# Patient Record
Sex: FEMALE | Race: OTHER | HISPANIC OR LATINO | Employment: FULL TIME | URBAN - METROPOLITAN AREA
[De-identification: names, ages, dates, MRNs, and addresses within clinical notes are randomized per-mention and may not be internally consistent; named-entity substitution may affect disease eponyms.]

---

## 2023-04-14 LAB
CHOLESTEROL (MG/DL) IN SER/PLAS: 170 MG/DL (ref 0–199)
CHOLESTEROL IN HDL (MG/DL) IN SER/PLAS: 75.5 MG/DL
CHOLESTEROL/HDL RATIO: 2.3
ERYTHROCYTE DISTRIBUTION WIDTH (RATIO) BY AUTOMATED COUNT: 13.3 % (ref 11.5–14.5)
ERYTHROCYTE MEAN CORPUSCULAR HEMOGLOBIN CONCENTRATION (G/DL) BY AUTOMATED: 31.5 G/DL (ref 32–36)
ERYTHROCYTE MEAN CORPUSCULAR VOLUME (FL) BY AUTOMATED COUNT: 91 FL (ref 80–100)
ERYTHROCYTES (10*6/UL) IN BLOOD BY AUTOMATED COUNT: 4.44 X10E12/L (ref 4–5.2)
ESTIMATED AVERAGE GLUCOSE FOR HBA1C: 111 MG/DL
HEMATOCRIT (%) IN BLOOD BY AUTOMATED COUNT: 40.3 % (ref 36–46)
HEMOGLOBIN (G/DL) IN BLOOD: 12.7 G/DL (ref 12–16)
HEMOGLOBIN A1C/HEMOGLOBIN TOTAL IN BLOOD: 5.5 %
LDL: 82 MG/DL (ref 0–99)
LEUKOCYTES (10*3/UL) IN BLOOD BY AUTOMATED COUNT: 7 X10E9/L (ref 4.4–11.3)
PLATELETS (10*3/UL) IN BLOOD AUTOMATED COUNT: 233 X10E9/L (ref 150–450)
THYROTROPIN (MIU/L) IN SER/PLAS BY DETECTION LIMIT <= 0.05 MIU/L: 1.6 MIU/L (ref 0.44–3.98)
TRIGLYCERIDE (MG/DL) IN SER/PLAS: 63 MG/DL (ref 0–149)
VLDL: 13 MG/DL (ref 0–40)

## 2023-10-15 RX ORDER — LEVONORGESTREL 52 MG/1
INTRAUTERINE DEVICE INTRAUTERINE
COMMUNITY

## 2023-10-17 ENCOUNTER — HOSPITAL ENCOUNTER (OUTPATIENT)
Dept: RADIOLOGY | Facility: HOSPITAL | Age: 33
Discharge: HOME | End: 2023-10-17
Payer: COMMERCIAL

## 2023-10-17 ENCOUNTER — OFFICE VISIT (OUTPATIENT)
Dept: ORTHOPEDIC SURGERY | Facility: HOSPITAL | Age: 33
End: 2023-10-17
Payer: COMMERCIAL

## 2023-10-17 DIAGNOSIS — M25.871 MASS OF JOINT OF RIGHT ANKLE: ICD-10-CM

## 2023-10-17 PROCEDURE — 73610 X-RAY EXAM OF ANKLE: CPT | Mod: RIGHT SIDE | Performed by: RADIOLOGY

## 2023-10-17 PROCEDURE — 73610 X-RAY EXAM OF ANKLE: CPT | Mod: RT,FY

## 2023-10-17 PROCEDURE — 99213 OFFICE O/P EST LOW 20 MIN: CPT | Performed by: STUDENT IN AN ORGANIZED HEALTH CARE EDUCATION/TRAINING PROGRAM

## 2023-10-17 PROCEDURE — 99203 OFFICE O/P NEW LOW 30 MIN: CPT | Performed by: STUDENT IN AN ORGANIZED HEALTH CARE EDUCATION/TRAINING PROGRAM

## 2023-10-17 PROCEDURE — 1036F TOBACCO NON-USER: CPT | Performed by: STUDENT IN AN ORGANIZED HEALTH CARE EDUCATION/TRAINING PROGRAM

## 2023-10-17 NOTE — PROGRESS NOTES
ORTHOPAEDIC SURGERY HISTORY & PHYSICAL     Chief Complaint:  Right ankle mass    History Of Present Illness  Cristina Bowery-Lombardo is a 33 y.o. female who presents for evaluation of the right foot mass, self-referred.  Patient reports noticing a mass on the outside of her ankle for approximately 1 month.  There is no associated pain with the mass.  She has not had any irritation with shoe wear.  She reports no trauma or associated numbness, tingling or weakness.  She has not noted any change in the size of the mass, neither increasing or decreasing.  She has not had any other associated symptoms.    Patient reports a remote history, circa 2011 of a significant foot injury while in college.  She brought with her a CT report that describes a intra-articular cuboid fracture.  She has not had significant symptoms from this injury.    Patient is otherwise in her usual state of health.  She works in a sales capacity.     Past Medical History  Past Medical History:   Diagnosis Date    Other specified health status     Patient denies medical problems       Surgical History  Recent Surgeries in Orthopaedic Surgery            No cases to display             Social History  Social History     Socioeconomic History    Marital status:      Spouse name: None    Number of children: None    Years of education: None    Highest education level: None   Occupational History    None   Tobacco Use    Smoking status: Never    Smokeless tobacco: Never   Substance and Sexual Activity    Alcohol use: None    Drug use: None    Sexual activity: None   Other Topics Concern    None   Social History Narrative    None     Social Determinants of Health     Financial Resource Strain: Not on file   Food Insecurity: Not on file   Transportation Needs: Not on file   Physical Activity: Not on file   Stress: Not on file   Social Connections: Not on file   Intimate Partner Violence: Not on file   Housing Stability: Not on file       Family  History  Family History   Problem Relation Name Age of Onset    Osteopenia Mother      Hypertension Father          Allergies  Allergies   Allergen Reactions    Penicillins Agitation, Chills, Dry mouth, Fever, Hives, Itching and Rash       Review of Systems  REVIEW OF SYSTEMS  Constitutional: no unplanned weight loss  Psychiatric: no suicidal ideation  ENT: no vision changes, no sinus problems  Pulmonary: no shortness of breath during office visit today  Lymphatic: no enlarged lymph nodes  Cardiovascular: no chest pain or shortness of breath during office visit today  Gastrointestinal: no stomach problems  Genitourinary: no dysuria   Skin: no rashes  Endocrine: no thyroid problems  Neurological: no headache, no numbness  Hematological: no easy bruising  Musculoskeletal: Right foot mass    Physical Exam  PHYSICAL EXAMINATION  Constitutional Exam: well developed and well nourished  Psychiatric Exam: alert and oriented, appropriate mood and behavior  Eye Exam: EOMI  Pulmonary Exam: breathing non-labored, no apparent distress  Lymphatic exam: no appreciable lymphadenopathy in the lower extremities  Cardiovascular exam: RRR to peripheral palpation, DP pulses 2+, PT 2+, toes are pink with good capillary refill, no pitting edema  Skin exam: no open lesions, rashes, abrasions or ulcerations  Neurological exam: sensation to light touch intact in both lower extremities in peripheral and dermatomal distributions (except for any abnormalities noted in musculoskeletal exam)    Musculoskeletal exam: Right lower extremity examination.  There is a 1 x 1 cm mobile nodule overlying the anterior lateral distal fibula.  Does not move with inversion or eversion of the foot, nor with ankle dorsiflexion or plantarflexion.  Patient is otherwise nontender to palpation at the ATFL, CFL and peroneal tendons.  Patient has supple and pain-free ankle, subtalar and midtarsal joint range of motion.  She has sensation intact light touch grossly in  a saphenous, sural, superficial peroneal, deep peroneal and tibial nerve distribution.  She has 5 out of 5 strength in plantarflexion, dorsiflexion and EHL.  She has 2+ DP/PT pulse palpated.    Last Recorded Vitals  There were no vitals taken for this visit.    Laboratory Results    No results found for this or any previous visit (from the past 24 hour(s)).    Radiology Results   X-ray imaging 3 view weightbearing right ankle reviewed from 10/17/2023 and independently evaluated by me demonstrates no acute fracture or dislocation.  Ankle mortise appears well aligned.  Lateral projection suggests increased anterior distal tibial osteophytes, perhaps consistent with anterior ankle impingement or sequela of prior trauma.    Assessment/Plan:  33-year-old female who in my impression has suspected right ankle ganglion cyst, currently asymptomatic.  I have reviewed the diagnosis and treatment options extensively with the patient.  In my impression, the patient may continue weightbearing as tolerated in her right lower extremity.  Given that she currently has no associated pain or irritation with shoe wear and has not noted any change in the size of the mass, I encouraged the patient to contact the office if she develops any new pain, change in her symptoms or if she has any further questions.  I reviewed that clinically I suspect a ganglion cyst and that these can wax and wane in both symptoms and size and treatment would be dictated by her symptoms at this time.  I will plan to see the patient back in approximately 3 months for a repeat clinical and radiographic evaluation.  Anticipate discussion regarding MRI with and without contrast to evaluate her ankle mass.  Upon return, patient require 3 views weightbearing right foot to evaluate her prior cuboid injury.    Hitesh Diggs MD, BRENNA  Department of Orthopaedic Surgery  [facility]    The diagnosis and treatment plan were reviewed  with the patient. All questions were answered. The patient verbalized understanding of the treatment plan. There were no barriers to understanding identified.    Note dictated with Install[employer]etizer software.  Completed without full type editing and sent to avoid delay.

## 2023-10-26 DIAGNOSIS — R22.41 ANKLE MASS, RIGHT: Primary | ICD-10-CM

## 2023-11-10 ENCOUNTER — APPOINTMENT (OUTPATIENT)
Dept: RADIOLOGY | Facility: CLINIC | Age: 33
End: 2023-11-10
Payer: COMMERCIAL

## 2023-11-27 ENCOUNTER — APPOINTMENT (OUTPATIENT)
Dept: RADIOLOGY | Facility: CLINIC | Age: 33
End: 2023-11-27
Payer: COMMERCIAL

## 2023-12-05 ENCOUNTER — OFFICE VISIT (OUTPATIENT)
Dept: ORTHOPEDIC SURGERY | Facility: HOSPITAL | Age: 33
End: 2023-12-05
Payer: COMMERCIAL

## 2023-12-05 DIAGNOSIS — M25.871 MASS OF JOINT OF RIGHT ANKLE: Primary | ICD-10-CM

## 2023-12-05 PROCEDURE — 99213 OFFICE O/P EST LOW 20 MIN: CPT | Performed by: STUDENT IN AN ORGANIZED HEALTH CARE EDUCATION/TRAINING PROGRAM

## 2023-12-05 PROCEDURE — 1036F TOBACCO NON-USER: CPT | Performed by: STUDENT IN AN ORGANIZED HEALTH CARE EDUCATION/TRAINING PROGRAM

## 2023-12-05 NOTE — PROGRESS NOTES
ORTHOPAEDIC SURGERY PROGRESS NOTE    Chief Complaint:  Right ankle mass    History Of Present Illness  Cristina Bowery-Lombardo is a 33 y.o. female who presents for evaluation of the right foot mass, self-referred.  Patient reports noticing a mass on the outside of her ankle for approximately 1 month.  There is no associated pain with the mass.  She has not had any irritation with shoe wear.  She reports no trauma or associated numbness, tingling or weakness.  She has not noted any change in the size of the mass, neither increasing or decreasing.  She has not had any other associated symptoms.    Patient reports a remote history, circa 2011 of a significant foot injury while in college.  She brought with her a CT report that describes a intra-articular cuboid fracture.  She has not had significant symptoms from this injury.    Patient is otherwise in her usual state of health.  She works in a sales capacity.    12/05/2023: Patient returns for follow-up of her right foot mass.  Patient reports no significant change in the size or symptoms related to her foot mass.  She has noted increased pressure and swelling on certain days which results in discomfort but has not limited any of her activities.  She denies any associated numbness, tingling or weakness.     Past Medical History  Past Medical History:   Diagnosis Date    Other specified health status     Patient denies medical problems       Surgical History  Recent Surgeries in Orthopaedic Surgery            No cases to display             Social History  Social History     Socioeconomic History    Marital status:      Spouse name: Not on file    Number of children: Not on file    Years of education: Not on file    Highest education level: Not on file   Occupational History    Not on file   Tobacco Use    Smoking status: Never    Smokeless tobacco: Never   Substance and Sexual Activity    Alcohol use: Not on file    Drug use: Not on file    Sexual activity:  Not on file   Other Topics Concern    Not on file   Social History Narrative    Not on file     Social Determinants of Health     Financial Resource Strain: Not on file   Food Insecurity: Not on file   Transportation Needs: Not on file   Physical Activity: Not on file   Stress: Not on file   Social Connections: Not on file   Intimate Partner Violence: Not on file   Housing Stability: Not on file       Family History  Family History   Problem Relation Name Age of Onset    Osteopenia Mother      Hypertension Father          Allergies  Allergies   Allergen Reactions    Penicillins Agitation, Chills, Dry mouth, Fever, Hives, Itching and Rash       Review of Systems  REVIEW OF SYSTEMS  Constitutional: no unplanned weight loss  Psychiatric: no suicidal ideation  ENT: no vision changes, no sinus problems  Pulmonary: no shortness of breath during office visit today  Lymphatic: no enlarged lymph nodes  Cardiovascular: no chest pain or shortness of breath during office visit today  Gastrointestinal: no stomach problems  Genitourinary: no dysuria   Skin: no rashes  Endocrine: no thyroid problems  Neurological: no headache, no numbness  Hematological: no easy bruising  Musculoskeletal: Right foot mass    Physical Exam  PHYSICAL EXAMINATION  Constitutional Exam: well developed and well nourished  Psychiatric Exam: alert and oriented, appropriate mood and behavior  Eye Exam: EOMI  Pulmonary Exam: breathing non-labored, no apparent distress  Lymphatic exam: no appreciable lymphadenopathy in the lower extremities  Cardiovascular exam: RRR to peripheral palpation, DP pulses 2+, PT 2+, toes are pink with good capillary refill, no pitting edema  Skin exam: no open lesions, rashes, abrasions or ulcerations  Neurological exam: sensation to light touch intact in both lower extremities in peripheral and dermatomal distributions (except for any abnormalities noted in musculoskeletal exam)    Musculoskeletal exam: Right lower extremity  examination.  Patient continues with a mobile nodule just distal to the fibula.  No obvious movement with ankle inversion or eversion nor with ankle dorsiflexion or plantarflexion.  Patient is currently nontender to palpation at the ATFL, CFL and peroneal tendons.  She continues with supple and pain-free ankle, subtalar and midtarsal joint range of motion.  She has sensation intact light touch grossly to saphenous, sural, superficial peroneal, deep peroneal and tibial nerve distribution.  She has 5 out of 5 strength in plantarflexion, dorsiflexion and EHL.  She has 2+ DP/PT pulse palpated.    Last Recorded Vitals  There were no vitals taken for this visit.    Laboratory Results    No results found for this or any previous visit (from the past 24 hour(s)).    Radiology Results   MRI right ankle with and without contrast from [facility] facility reviewed and temporarily uploaded into our PACS system from 11/29/2023 independently evaluated by me on 12/05/2023 demonstrates no obvious fracture or dislocation.  There is a multilobulated cyst that appears to communicate with the subtalar joint, no obvious connection to peroneal tendons.    Assessment/Plan:  33-year-old female who in my impression has suspected right ankle ganglion cyst with intermittent swelling, pressure and discomfort.  I have reviewed the diagnosis and treatment options extensively with the patient.  In my impression the patient may continue weightbearing as tolerated in her right lower extremity.  I discussed with the patient that due to her overall minimal symptoms that I would not recommend excisional biopsy at this time.  I reviewed that given the relative proximity to the subtalar joint/peroneal tendons that it may be quite difficult to close the pericapsular tissue and so may have an increased risk of recurrence inherently.  I reviewed that due to the multilobulated nature of the mass that is it reasonable to attempt for aspiration and injection USG and  I have personally reached out to one of my sports medicine colleagues to coordinate care for this patient.  I will plan to see the patient back in approximate 3 months to monitor her response to treatment.  Upon return, patient would not require further imaging.    Hitesh Diggs MD, BRENNA  Department of Orthopaedic Surgery  [facility]    The diagnosis and treatment plan were reviewed with the patient. All questions were answered. The patient verbalized understanding of the treatment plan. There were no barriers to understanding identified.    Note dictated with Trot software.  Completed without full type editing and sent to avoid delay.

## 2023-12-07 ENCOUNTER — OFFICE VISIT (OUTPATIENT)
Dept: ORTHOPEDIC SURGERY | Facility: CLINIC | Age: 33
End: 2023-12-07
Payer: COMMERCIAL

## 2023-12-07 DIAGNOSIS — M25.871 MASS OF JOINT OF RIGHT ANKLE: Primary | ICD-10-CM

## 2023-12-07 PROCEDURE — 10160 PNXR ASPIR ABSC HMTMA BULLA: CPT | Performed by: FAMILY MEDICINE

## 2023-12-07 PROCEDURE — 76942 ECHO GUIDE FOR BIOPSY: CPT | Performed by: FAMILY MEDICINE

## 2023-12-07 PROCEDURE — 1036F TOBACCO NON-USER: CPT | Performed by: FAMILY MEDICINE

## 2023-12-07 PROCEDURE — 99213 OFFICE O/P EST LOW 20 MIN: CPT | Performed by: FAMILY MEDICINE

## 2023-12-07 ASSESSMENT — PAIN - FUNCTIONAL ASSESSMENT: PAIN_FUNCTIONAL_ASSESSMENT: 0-10

## 2023-12-07 ASSESSMENT — PAIN DESCRIPTION - DESCRIPTORS: DESCRIPTORS: DISCOMFORT

## 2023-12-07 ASSESSMENT — PAIN SCALES - GENERAL: PAINLEVEL_OUTOF10: 0 - NO PAIN

## 2023-12-07 NOTE — PROGRESS NOTES
Dr Diggs ref R ankle ganglion cyst asp     Subjective    Patient ID: Cristina Bowery-Lombardo is a 33 y.o. female.    Chief Complaint: Pain of the Right Ankle and Ganglion Cyst   Cristina Bowery-Lombardo is a 33 y.o. female who presents for evaluation of the right foot mass, from Dr. Diggs.  Please see his notes in the chart for complete details and treatment course.  Briefly, patient reports noticing a mass on the outside of her ankle for approximately 4 months.  There is no associated pain with the mass.  She has noticed recently that she has had some irritation with shoe wear and possibly walking a little bit differently because of it.    Patient reports a remote history, circa 2011 of a significant foot injury while in college, a intra-articular cuboid fracture.  She has not had significant symptoms from this injury.     Patient is otherwise in her usual state of health.  She works in a sales capacity.     She has been referred here to discuss the possibility of an ultrasound-guided cyst drainage and steroid injection.    Objective   Musculoskeletal exam: Right lower extremity examination.  Patient continues with a mobile nodule just distal to the fibula.  No obvious movement with ankle inversion or eversion nor with ankle dorsiflexion or plantarflexion.  Patient is currently nontender to palpation at the ATFL, CFL and peroneal tendons.  She continues with supple and pain-free ankle, subtalar and midtarsal joint range of motion.  She has sensation intact light touch grossly to saphenous, sural, superficial peroneal, deep peroneal and tibial nerve distribution.  She has 5 out of 5 strength in plantarflexion, dorsiflexion and EHL.  She has 2+ DP/PT pulse palpated.  No significant change from Dr. Jhaveri's exam.    Image Results:  XR ankle right 3+ views  Narrative: Interpreted By:  Sincere Casillas,   STUDY:  XR ANKLE RIGHT 3+ VIEWS      INDICATION:  Signs/Symptoms:mass of R ankle.      COMPARISON:  None       ACCESSION NUMBER(S):  [id number]      ORDERING CLINICIAN:  MANSI ARELLANO      FINDINGS:  Mild pes planus deformity.      No evidence of right ankle fracture or lesion. Joint spaces are  normal.      Impression: Pes planus deformity. No acute findings right ankle.          Signed by: Sincere Casillas 10/17/2023 10:30 AM  Dictation workstation:   [id number]    MRI right ankle with and without contrast from OSH facility reviewed and temporarily uploaded into our PACS system from 11/29/2023 independently evaluated by me on 12/05/2023 demonstrates no obvious fracture or dislocation.  There is a multilobulated cyst that appears to communicate with the subtalar joint, no obvious connection to peroneal tendons.     US guided aspiration of abscess, hematoma, cyst    Date/Time: 12/7/2023 10:47 AM    Performed by: Telly Cline MD  Authorized by: Telly Cline MD    Consent:     Consent obtained:  Verbal    Consent given by:  Patient    Risks, benefits, and alternatives were discussed: yes      Risks discussed:  Bleeding, infection, pain, incomplete drainage, poor cosmetic result and nerve damage    Alternatives discussed:  No treatment, alternative treatment and observation  Universal protocol:     Procedure explained and questions answered to patient or proxy's satisfaction: yes      Imaging studies available: yes      Site/side marked: yes      Immediately prior to procedure, a time out was called: yes      Patient identity confirmed:  Verbally with patient  Pre-procedure details:     Skin preparation:  Povidone-iodine  Sedation:     Sedation type:  None  Anesthesia:     Anesthesia method:  Topical application and local infiltration    Local anesthetic:  Lidocaine 1% w/o epi  Procedure specific details:      Ganglion cyst of the wrist drainage and steroid injection under ultrasound guidance. Risks, benefits, and alternatives were explained to the patient and verbal and written consent was obtained. The patient  was placed in a seated position and a bump was placed under the . The linear transducer was used to identify the ganglion cyst. The area of injection was cleaned with a Betadine prep and ethyl chloride spray was used to numb the skin. A 5/8 inch 25-gauge needle was used to inject 1 mL of 1% lidocaine into the skin and deep tissue for local anesthesia. Next, an 18-gauge 1-1/2 inch needle was placed into the cyst and 2-1/2 cc of viscous clear fluid was removed from the cyst. The cyst resolved under ultrasound.  The syringe was then changed to 1 containing 1 mL of Kenalog which was injected. The fluid flowed freely without obstruction and the entire contents of the syringe was injected. The needle tip was visualized throughout and pictures were taken with the ultrasound machine. The needle was then removed, the areas cleaned with an alcohol prep, and a sterile Band-Aid was placed. The patient tolerated procedure well there were no locations or was no blood loss. Ultrasound images were obtained throughout the procedure and stored for later review if needed..  Post-procedure details:     Procedure completion:  Tolerated well, no immediate complications        Assessment/Plan   Encounter Diagnoses:  Mass of joint of right ankle    Orders Placed This Encounter    Point of Care Ultrasound    US guided aspiration of abscess, hematoma, cyst     She tolerated the procedure well.  We reviewed that she should not submerge the ankle for the next 2 to 3 days.  She should take it easy on the ankle for the next 24 to 48 hours.  She can take anti-inflammatories as needed for discomfort.  There is the possibility that this will return.  She can follow-up with either myself or Dr. Jhaveri.  All of her questions were answered and she agrees with treatment plan.    ** Please excuse any errors in grammar or translation related to this dictation. Voice recognition software was utilized to prepare this document. **    Telly Cline,  M.D.  Clinical , Division of Sports Medicine  Primary Care Sports Medicine  Department of Orthopedic Surgery  [facility] [place]

## 2024-01-16 ENCOUNTER — APPOINTMENT (OUTPATIENT)
Dept: ORTHOPEDIC SURGERY | Facility: HOSPITAL | Age: 34
End: 2024-01-16
Payer: COMMERCIAL

## 2024-01-16 ENCOUNTER — OFFICE VISIT (OUTPATIENT)
Dept: ORTHOPEDIC SURGERY | Facility: HOSPITAL | Age: 34
End: 2024-01-16
Payer: COMMERCIAL

## 2024-01-16 DIAGNOSIS — M25.871 MASS OF JOINT OF RIGHT ANKLE: Primary | ICD-10-CM

## 2024-01-16 PROCEDURE — 1036F TOBACCO NON-USER: CPT | Performed by: STUDENT IN AN ORGANIZED HEALTH CARE EDUCATION/TRAINING PROGRAM

## 2024-01-16 PROCEDURE — 99212 OFFICE O/P EST SF 10 MIN: CPT | Performed by: STUDENT IN AN ORGANIZED HEALTH CARE EDUCATION/TRAINING PROGRAM

## 2024-01-16 ASSESSMENT — PAIN - FUNCTIONAL ASSESSMENT: PAIN_FUNCTIONAL_ASSESSMENT: 0-10

## 2024-01-16 ASSESSMENT — PAIN SCALES - GENERAL: PAINLEVEL_OUTOF10: 2

## 2024-01-16 ASSESSMENT — PAIN DESCRIPTION - DESCRIPTORS: DESCRIPTORS: ACHING;DULL

## 2024-01-16 NOTE — PROGRESS NOTES
ORTHOPAEDIC SURGERY PROGRESS NOTE    Chief Complaint:  Right ankle mass    History Of Present Illness  Cristina Bowery-Lombardo is a 33 y.o. female who presents for evaluation of the right foot mass, self-referred.  Patient reports noticing a mass on the outside of her ankle for approximately 1 month.  There is no associated pain with the mass.  She has not had any irritation with shoe wear.  She reports no trauma or associated numbness, tingling or weakness.  She has not noted any change in the size of the mass, neither increasing or decreasing.  She has not had any other associated symptoms.    Patient reports a remote history, circa 2011 of a significant foot injury while in college.  She brought with her a CT report that describes a intra-articular cuboid fracture.  She has not had significant symptoms from this injury.    Patient is otherwise in her usual state of health.  She works in a sales capacity.    12/05/2023: Patient returns for follow-up of her right foot mass.  Patient reports no significant change in the size or symptoms related to her foot mass.  She has noted increased pressure and swelling on certain days which results in discomfort but has not limited any of her activities.  She denies any associated numbness, tingling or weakness.    01/16/2024: Patient returns for follow-up of her right foot mass.  She did undergo USG aspiration from 12/07/2023.  She did initially notice a decrease in the size of the mass and symptoms but unfortunately had recurrence approximately 10 days later.  She has had some increased irritation with shoewear overlying the mass but overall reports 2 out of 10 pain.  She denies any new trauma.  She has no associated numbness, tingling or weakness.     Past Medical History  Past Medical History:   Diagnosis Date    Other specified health status     Patient denies medical problems       Surgical History  Recent Surgeries in Orthopaedic Surgery            No cases to display              Social History  Social History     Socioeconomic History    Marital status:      Spouse name: Not on file    Number of children: Not on file    Years of education: Not on file    Highest education level: Not on file   Occupational History    Not on file   Tobacco Use    Smoking status: Never    Smokeless tobacco: Never   Substance and Sexual Activity    Alcohol use: Not on file    Drug use: Not on file    Sexual activity: Not on file   Other Topics Concern    Not on file   Social History Narrative    Not on file     Social Determinants of Health     Financial Resource Strain: Not on file   Food Insecurity: Not on file   Transportation Needs: Not on file   Physical Activity: Not on file   Stress: Not on file   Social Connections: Not on file   Intimate Partner Violence: Not on file   Housing Stability: Not on file       Family History  Family History   Problem Relation Name Age of Onset    Osteopenia Mother      Hypertension Father          Allergies  Allergies   Allergen Reactions    Penicillins Agitation, Chills, Dry mouth, Fever, Hives, Itching and Rash       Review of Systems  REVIEW OF SYSTEMS  Constitutional: no unplanned weight loss  Psychiatric: no suicidal ideation  ENT: no vision changes, no sinus problems  Pulmonary: no shortness of breath during office visit today  Lymphatic: no enlarged lymph nodes  Cardiovascular: no chest pain or shortness of breath during office visit today  Gastrointestinal: no stomach problems  Genitourinary: no dysuria   Skin: no rashes  Endocrine: no thyroid problems  Neurological: no headache, no numbness  Hematological: no easy bruising  Musculoskeletal: Right foot mass    Physical Exam  PHYSICAL EXAMINATION  Constitutional Exam: well developed and well nourished  Psychiatric Exam: alert and oriented, appropriate mood and behavior  Eye Exam: EOMI  Pulmonary Exam: breathing non-labored, no apparent distress  Lymphatic exam: no appreciable lymphadenopathy in the  lower extremities  Cardiovascular exam: RRR to peripheral palpation, DP pulses 2+, PT 2+, toes are pink with good capillary refill, no pitting edema  Skin exam: no open lesions, rashes, abrasions or ulcerations  Neurological exam: sensation to light touch intact in both lower extremities in peripheral and dermatomal distributions (except for any abnormalities noted in musculoskeletal exam)    Musculoskeletal exam: Right lower extremity examination.  Patient with mobile soft tissue mass just distal to the fibula but the sinus Tarsi.  She is focally nontender to palpation overlying the mass.  There does appear to be excoriated skin.  She continues with supple and pain-free ankle, subtalar and midtarsal joint range of motion.  She has sensation intact light touch grossly to saphenous, sural, superficial peroneal, deep peroneal and tibial nerve distribution.  She has 5 out of 5 strength in plantarflexion, dorsiflexion and EHL.  She has 2+ DP/PT pulse palpated.    Last Recorded Vitals  There were no vitals taken for this visit.    Laboratory Results    No results found for this or any previous visit (from the past 24 hour(s)).    Radiology Results   No new imaging at this visit.    Assessment/Plan:  33-year-old female who in my impression has suspected right ankle ganglion cyst, MRI with and without contrast temporarily uploaded into the PACS who presents with right foot mass recurrence following USG attempted aspiration.  I have reviewed the diagnosis and treatment options extensively with the patient.  In my impression the patient should continue weightbearing as tolerated in her right lower extremity.  Based on the patient's recurrence following aspiration, I believe it is reasonable to proceed with right foot mass excisional biopsy.  The patient is going to take some time to consider her options and planned recovery.  I have encouraged the patient to contact the office when she would like to go forward with surgery  scheduling.  Upon return, patient would not require further imaging.    Hitesh Diggs MD, BRENNA  Department of Orthopaedic Surgery  [facility]    The diagnosis and treatment plan were reviewed with the patient. All questions were answered. The patient verbalized understanding of the treatment plan. There were no barriers to understanding identified.    Note dictated with Datalink software.  Completed without full type editing and sent to avoid delay.

## 2024-03-05 ENCOUNTER — APPOINTMENT (OUTPATIENT)
Dept: ORTHOPEDIC SURGERY | Facility: HOSPITAL | Age: 34
End: 2024-03-05
Payer: COMMERCIAL

## 2024-03-05 ENCOUNTER — OFFICE VISIT (OUTPATIENT)
Dept: ORTHOPEDIC SURGERY | Facility: HOSPITAL | Age: 34
End: 2024-03-05
Payer: COMMERCIAL

## 2024-03-05 DIAGNOSIS — R22.41 MASS OF RIGHT FOOT: Primary | ICD-10-CM

## 2024-03-05 PROCEDURE — 1036F TOBACCO NON-USER: CPT | Performed by: STUDENT IN AN ORGANIZED HEALTH CARE EDUCATION/TRAINING PROGRAM

## 2024-03-05 PROCEDURE — L4361 PNEUMA/VAC WALK BOOT PRE OTS: HCPCS | Performed by: STUDENT IN AN ORGANIZED HEALTH CARE EDUCATION/TRAINING PROGRAM

## 2024-03-05 PROCEDURE — 99214 OFFICE O/P EST MOD 30 MIN: CPT | Performed by: STUDENT IN AN ORGANIZED HEALTH CARE EDUCATION/TRAINING PROGRAM

## 2024-03-05 RX ORDER — SODIUM CHLORIDE, SODIUM LACTATE, POTASSIUM CHLORIDE, CALCIUM CHLORIDE 600; 310; 30; 20 MG/100ML; MG/100ML; MG/100ML; MG/100ML
100 INJECTION, SOLUTION INTRAVENOUS CONTINUOUS
Status: CANCELLED | OUTPATIENT
Start: 2024-03-05

## 2024-03-05 RX ORDER — CEFAZOLIN SODIUM 2 G/100ML
2 INJECTION, SOLUTION INTRAVENOUS ONCE
Status: CANCELLED | OUTPATIENT
Start: 2024-03-05 | End: 2024-03-05

## 2024-03-05 ASSESSMENT — PAIN - FUNCTIONAL ASSESSMENT: PAIN_FUNCTIONAL_ASSESSMENT: NO/DENIES PAIN

## 2024-03-05 NOTE — PROGRESS NOTES
ORTHOPAEDIC SURGERY PROGRESS NOTE    Chief Complaint:  Right ankle mass    History Of Present Illness  Cristina Bowery-Lombardo is a 33 y.o. female who presents for evaluation of the right foot mass, self-referred.  Patient reports noticing a mass on the outside of her ankle for approximately 1 month.  There is no associated pain with the mass.  She has not had any irritation with shoe wear.  She reports no trauma or associated numbness, tingling or weakness.  She has not noted any change in the size of the mass, neither increasing or decreasing.  She has not had any other associated symptoms.    Patient reports a remote history, circa 2011 of a significant foot injury while in college.  She brought with her a CT report that describes a intra-articular cuboid fracture.  She has not had significant symptoms from this injury.    Patient is otherwise in her usual state of health.  She works in a sales capacity.    12/05/2023: Patient returns for follow-up of her right foot mass.  Patient reports no significant change in the size or symptoms related to her foot mass.  She has noted increased pressure and swelling on certain days which results in discomfort but has not limited any of her activities.  She denies any associated numbness, tingling or weakness.    01/16/2024: Patient returns for follow-up of her right foot mass.  She did undergo USG aspiration from 12/07/2023.  She did initially notice a decrease in the size of the mass and symptoms but unfortunately had recurrence approximately 10 days later.  She has had some increased irritation with shoewear overlying the mass but overall reports 2 out of 10 pain.  She denies any new trauma.  She has no associated numbness, tingling or weakness.    03/05/2024: Patient returns for follow-up of her right foot mass.  She is present with her  today.  She would like to have the mass removed as it has been been persistently bothersome to her, particularly with shoewear.   She has been covering the site to avoid irritation.  There has been significant improvement.  She reports no interval trauma or associated numbness, tingling weakness.  She denies pain over uneven surfaces.     Past Medical History  Past Medical History:   Diagnosis Date    Other specified health status     Patient denies medical problems       Surgical History  Recent Surgeries in Orthopaedic Surgery            No cases to display             Social History  Social History     Socioeconomic History    Marital status:      Spouse name: None    Number of children: None    Years of education: None    Highest education level: None   Occupational History    None   Tobacco Use    Smoking status: Never    Smokeless tobacco: Never   Substance and Sexual Activity    Alcohol use: Never    Drug use: Never    Sexual activity: None   Other Topics Concern    None   Social History Narrative    None     Social Determinants of Health     Financial Resource Strain: Not on file   Food Insecurity: Not on file   Transportation Needs: Not on file   Physical Activity: Not on file   Stress: Not on file   Social Connections: Not on file   Intimate Partner Violence: Not on file   Housing Stability: Not on file       Family History  Family History   Problem Relation Name Age of Onset    Osteopenia Mother      Hypertension Father          Allergies  Allergies   Allergen Reactions    Penicillins Agitation, Chills, Dry mouth, Fever, Hives, Itching and Rash       Review of Systems  REVIEW OF SYSTEMS  Constitutional: no unplanned weight loss  Psychiatric: no suicidal ideation  ENT: no vision changes, no sinus problems  Pulmonary: no shortness of breath during office visit today  Lymphatic: no enlarged lymph nodes  Cardiovascular: no chest pain or shortness of breath during office visit today  Gastrointestinal: no stomach problems  Genitourinary: no dysuria   Skin: no rashes  Endocrine: no thyroid problems  Neurological: no headache, no  numbness  Hematological: no easy bruising  Musculoskeletal: Right foot mass    Physical Exam  PHYSICAL EXAMINATION  Constitutional Exam: well developed and well nourished  Psychiatric Exam: alert and oriented, appropriate mood and behavior  Eye Exam: EOMI  Pulmonary Exam: breathing non-labored, no apparent distress  Lymphatic exam: no appreciable lymphadenopathy in the lower extremities  Cardiovascular exam: RRR to peripheral palpation, DP pulses 2+, PT 2+, toes are pink with good capillary refill, no pitting edema  Skin exam: no open lesions, rashes, abrasions or ulcerations  Neurological exam: sensation to light touch intact in both lower extremities in peripheral and dermatomal distributions (except for any abnormalities noted in musculoskeletal exam)    Musculoskeletal exam: Right lower extremity examination.  Patient continues with mobile soft tissue mass just distal to the fibula about the sinus Tarsi.  She is minimally tender to palpation overlying the mass, there has been notable improvement in the previously excoriated appearing skin.  She continues with supple and pain-free ankle, subtalar and midtarsal joint range of motion.  She has sensation intact light touch grossly in a saphenous, sural, superficial peroneal, deep peroneal and tibial nerve distribution.  She has 5 out of 5 strength in plantarflexion, dorsiflexion and EHL.  She has 2+ DP/PT pulse palpated.    Last Recorded Vitals  There were no vitals taken for this visit.    Laboratory Results    No results found for this or any previous visit (from the past 24 hour(s)).    Radiology Results   No new imaging at this visit.    Assessment/Plan:  33-year-old female who in my impression has suspected right ankle ganglion cyst, MRI with and without contrast temporarily uploaded into the PACS who presents with right foot mass recurrence following USG attempted aspiration.  I have confirmed with the patient that she utilizes Elizabeth as her first name, her  imaging uploaded to PACS was denoted as Marianne Tay.  I discussed at length with the patient and her  regarding treatment options.  Given the persistence of her symptoms and recurrence following aspiration I would recommend that we perform a right foot mass excisional biopsy.  I discussed with the patient and her  at length that there is risk that this may be more than a ganglion cyst and that I would send specimen to pathology to obtain a diagnosis.  Additionally based on the location, I discussed that there is risk of recurrence and potential for injury to the subtalar joint.  Further risks include but are not limited to infection, wound healing complications, need for further surgery, persistent or worsening pain, postoperative stiffness, bleeding, blood loss requiring a blood transfusion, neurovascular injury, failure of the procedure, complications of anesthesia, stroke, DVT/PE, myocardial infarction and death. Benefits included decreased pain and irritation, improved function and obtaining a tissue diagnosis. Alternatives included continued conservative management. The patient voiced understanding of the risks, benefits and alternatives to surgery.    Surgical Discussion/Plan    We discussed the diagnosis, prognosis, and all treatment options including those non-operative and surgical, along with respective risks/benefits/recovery.  The patient has failed an extensive, appropriate course of non-operative care and persists with symptoms, functional limitations, and negatively effected quality of life.  The patient would like to pursue surgical intervention.     We discussed the in's and out's of surgery.  It would be done as ambulatory/SDS status, anesthesia: Regional + MAC, and pre-op testing: PAT visit to be setup.     Surgery date, scheduling, and all paperwork completed today for planned procedure.    A significant amount of time was also spent in counseling and coordination of care  activities, which specifically included discussing/counseling the patient/family on the above specifics of surgery/risks/benefits/goals/expectations/recovery process and alternative treatments as detailed above, in addition to coordinating/providing postop prescriptions (pain medication, DVT prophylaxis - see rx'd meds), ambulatory-assist equipment such as crutches/knee walker/wheelchair as indicated, and any other necessary post-op planning needed to ensure excellent patient care and patient safety.    Hitesh Diggs MD, BRENNA  Department of Orthopaedic Surgery  [facility]    The diagnosis and treatment plan were reviewed with the patient. All questions were answered. The patient verbalized understanding of the treatment plan. There were no barriers to understanding identified.    Note dictated with miDrive software.  Completed without full type editing and sent to avoid delay.

## 2024-03-14 ENCOUNTER — TELEMEDICINE CLINICAL SUPPORT (OUTPATIENT)
Dept: PREADMISSION TESTING | Facility: HOSPITAL | Age: 34
End: 2024-03-14
Payer: COMMERCIAL

## 2024-03-14 DIAGNOSIS — R22.41 MASS OF RIGHT FOOT: ICD-10-CM

## 2024-03-14 RX ORDER — FERROUS SULFATE 325(65) MG
325 TABLET ORAL
COMMUNITY

## 2024-03-14 RX ORDER — BISMUTH SUBSALICYLATE 262 MG
1 TABLET,CHEWABLE ORAL DAILY
COMMUNITY

## 2024-03-21 ENCOUNTER — LAB (OUTPATIENT)
Dept: LAB | Facility: LAB | Age: 34
End: 2024-03-21
Payer: COMMERCIAL

## 2024-03-21 ENCOUNTER — PRE-ADMISSION TESTING (OUTPATIENT)
Dept: PREADMISSION TESTING | Facility: HOSPITAL | Age: 34
End: 2024-03-21
Payer: COMMERCIAL

## 2024-03-21 VITALS
HEIGHT: 70 IN | OXYGEN SATURATION: 99 % | HEART RATE: 76 BPM | RESPIRATION RATE: 18 BRPM | BODY MASS INDEX: 24.49 KG/M2 | DIASTOLIC BLOOD PRESSURE: 64 MMHG | SYSTOLIC BLOOD PRESSURE: 125 MMHG | TEMPERATURE: 97.9 F | WEIGHT: 171.08 LBS

## 2024-03-21 DIAGNOSIS — Z01.818 PREOP TESTING: ICD-10-CM

## 2024-03-21 DIAGNOSIS — Z01.818 PREOP TESTING: Primary | ICD-10-CM

## 2024-03-21 LAB
ANION GAP SERPL CALC-SCNC: 12 MMOL/L (ref 10–20)
BASOPHILS # BLD AUTO: 0.03 X10*3/UL (ref 0–0.1)
BASOPHILS NFR BLD AUTO: 0.3 %
BUN SERPL-MCNC: 15 MG/DL (ref 6–23)
CALCIUM SERPL-MCNC: 9.9 MG/DL (ref 8.6–10.3)
CHLORIDE SERPL-SCNC: 104 MMOL/L (ref 98–107)
CO2 SERPL-SCNC: 28 MMOL/L (ref 21–32)
CREAT SERPL-MCNC: 0.92 MG/DL (ref 0.5–1.05)
EGFRCR SERPLBLD CKD-EPI 2021: 86 ML/MIN/1.73M*2
EOSINOPHIL # BLD AUTO: 0.02 X10*3/UL (ref 0–0.7)
EOSINOPHIL NFR BLD AUTO: 0.2 %
ERYTHROCYTE [DISTWIDTH] IN BLOOD BY AUTOMATED COUNT: 13.3 % (ref 11.5–14.5)
GLUCOSE SERPL-MCNC: 106 MG/DL (ref 74–99)
HCT VFR BLD AUTO: 40.5 % (ref 36–46)
HGB BLD-MCNC: 13.2 G/DL (ref 12–16)
IMM GRANULOCYTES # BLD AUTO: 0.03 X10*3/UL (ref 0–0.7)
IMM GRANULOCYTES NFR BLD AUTO: 0.3 % (ref 0–0.9)
LYMPHOCYTES # BLD AUTO: 1.46 X10*3/UL (ref 1.2–4.8)
LYMPHOCYTES NFR BLD AUTO: 15.8 %
MCH RBC QN AUTO: 29 PG (ref 26–34)
MCHC RBC AUTO-ENTMCNC: 32.6 G/DL (ref 32–36)
MCV RBC AUTO: 89 FL (ref 80–100)
MONOCYTES # BLD AUTO: 0.49 X10*3/UL (ref 0.1–1)
MONOCYTES NFR BLD AUTO: 5.3 %
NEUTROPHILS # BLD AUTO: 7.23 X10*3/UL (ref 1.2–7.7)
NEUTROPHILS NFR BLD AUTO: 78.1 %
NRBC BLD-RTO: 0 /100 WBCS (ref 0–0)
PLATELET # BLD AUTO: 229 X10*3/UL (ref 150–450)
POTASSIUM SERPL-SCNC: 3.9 MMOL/L (ref 3.5–5.3)
RBC # BLD AUTO: 4.55 X10*6/UL (ref 4–5.2)
SODIUM SERPL-SCNC: 140 MMOL/L (ref 136–145)
WBC # BLD AUTO: 9.3 X10*3/UL (ref 4.4–11.3)

## 2024-03-21 PROCEDURE — 99203 OFFICE O/P NEW LOW 30 MIN: CPT | Performed by: NURSE PRACTITIONER

## 2024-03-21 PROCEDURE — 85025 COMPLETE CBC W/AUTO DIFF WBC: CPT

## 2024-03-21 PROCEDURE — 36415 COLL VENOUS BLD VENIPUNCTURE: CPT

## 2024-03-21 PROCEDURE — 80048 BASIC METABOLIC PNL TOTAL CA: CPT

## 2024-03-21 ASSESSMENT — ENCOUNTER SYMPTOMS
GASTROINTESTINAL NEGATIVE: 1
EYES NEGATIVE: 1
RESPIRATORY NEGATIVE: 1
NECK NEGATIVE: 1
MUSCULOSKELETAL NEGATIVE: 1
NEUROLOGICAL NEGATIVE: 1
CONSTITUTIONAL NEGATIVE: 1
ENDOCRINE NEGATIVE: 1
CARDIOVASCULAR NEGATIVE: 1

## 2024-03-21 NOTE — CPM/PAT H&P
[facility] [place]/PAT Evaluation       Name: Cristina Bowery-Lombardo (Cristina Bowery-Lombardo)  /Age: 1990/ y.o.     In-Person       Date of Consult: 3/21/24    Referring Provider: Dr. Diggs     Date, Surgery, and Length:  24, right foot synovectomy/bursectomy, 150 minutes    Patient presents to [facility] for perioperative risk assessment prior to scheduled surgery. She presents with right ankle ganglion cyst that has become increasingly bothersome.    This note was created in part upon personal review of patient's medical records.    Pt denies any past history of anesthetic complications such as PONV, awareness, prolonged sedation, dental damage, aspiration, cardiac arrest, difficult intubation, difficult I.V. access or unexpected hospital admissions.  No history of malignant hyperthermia and or pseudocholinesterase deficiency.    No history of blood transfusions.     The patient is not a Anglican and will accept blood and blood products if medically indicated. Type and screen not sent.    Past Medical History:   Diagnosis Date    Mass of right foot        Past Surgical History:   Procedure Laterality Date    FOOT SURGERY Right 2010    dislocated foot while working out    HIP SURGERY Left 2013    torn labrum    KNEE SURGERY Right 2008    dislocated knee cap/chip fracture    US GUIDED ASPIRATION OF ABSCESS, HEMATOMA, CYST  2023    US GUIDED ASPIRATION OF ABSCESS, HEMATOMA, CYST     Family History   Problem Relation Name Age of Onset    Osteopenia Mother      Hypertension Father      Stomach cancer Maternal Grandmother      Brain cancer Mother's Sister         Allergies   Allergen Reactions    Penicillins Hives and Itching     Social History     Tobacco Use   Smoking Status Never   Smokeless Tobacco Never     Social History     Substance and Sexual Activity   Alcohol Use Not Currently     Social History     Substance and Sexual Activity   Drug Use Never       Current Outpatient Medications:      "ferrous sulfate, 325 mg ferrous sulfate, tablet, Take 1 tablet by mouth once daily with breakfast., Disp: , Rfl:     multivitamin tablet, Take 1 tablet by mouth once daily., Disp: , Rfl:     levonorgestrel (Mirena) 21 mcg/24 hours (8 yrs) 52 mg IUD, by intrauterine route.  Inserted on 11/30/18, Disp: , Rfl:     Current Facility-Administered Medications:     triamcinolone acetonide (Kenalog) injection 40 mg, 40 mg, intralesional, Once, Telly Cline MD       PAT ROS:   Constitutional:   neg    Neuro/Psych:   neg    Eyes:   neg    Ears:   neg    Nose:   neg    Mouth:   neg    Throat:   neg    Neck:   neg    Cardio:   neg    Respiratory:   neg    Endocrine:   neg    GI:   neg    :   neg    Musculoskeletal:   neg    Hematologic:   neg    Skin:  neg        Physical Exam  Vitals reviewed. Physical exam within normal limits.          PAT AIRWAY:   Airway:     Mallampati::  II    Neck ROM::  Full   No broken teeth, no dentures and no missing teeth        Visit Vitals  /64   Pulse 76   Temp 36.6 °C (97.9 °F)   Resp 18   Ht 1.778 m (5' 10\")   Wt 77.6 kg (171 lb 1.2 oz)   SpO2 99%   BMI 24.55 kg/m²   Smoking Status Never   BSA 1.96 m²     Assessment and Plan:     Patient is  33 year old female scheduled for right foot synovectomy/bursectomy on 4/8/24 with Dr. Diggs.    Patient is at acceptable risk to proceed with planned surgical procedure. Further cardiac risk stratification deferred at this time.This patient is low risk candidate undergoing moderate risk procedure, patient is medically optimized for surgery    Plan    Cardiovascular:    Patient denies any chest pain, tightness, heaviness, pressure, radiating pain, palpitations, irregular heartbeats, lightheadedness, cough, congestion, shortness of breath, DENIS, PND, near syncope, weight loss or gain.    Good functional capacity    EKG in PAT not indicated.      RCRI: 0  Risk of Mace: 3.9%    Heme:  Patient instructed to ambulate as soon as possible " postoperatively to decrease thromboembolic risk.    Initiate mechanical DVT prophylaxis as soon as possible and initiate chemical prophylaxis when deemed safe from a bleeding standpoint post surgery.    Caprini=4      Risk assessment complete.  Patient is scheduled for a low surgical risk procedure.  She is considered medically optimized for the planned procedure.      Labs/testing obtained in PAT on 3/21/24: CBC, BMP    Lab Results   Component Value Date    WBC 9.3 03/21/2024    HGB 13.2 03/21/2024    HCT 40.5 03/21/2024    MCV 89 03/21/2024     03/21/2024     Lab Results   Component Value Date    GLUCOSE 106 (H) 03/21/2024    CALCIUM 9.9 03/21/2024     03/21/2024    K 3.9 03/21/2024    CO2 28 03/21/2024     03/21/2024    BUN 15 03/21/2024    CREATININE 0.92 03/21/2024       Follow up: none    Preoperative medication instructions were provided and reviewed with the patient.  Any additional testing or evaluation was explained to the patient.  Nothing by mouth instructions were discussed and patient's questions were answered prior to conclusion to this encounter.  Patient verbalized understanding of preoperative instructions given in preadmission testing; discharge instructions available in EMR.    This note was dictated with speech recognition.  Minor errors may have been detected during use of speech recognition.

## 2024-03-21 NOTE — H&P (VIEW-ONLY)
[facility]/PAT Evaluation       Name: Cristina Bowery-Lombardo (Cristina Bowery-Lombardo)  /Age: 1990/ y.o.     In-Person       Date of Consult: 3/21/24    Referring Provider: Dr. Diggs     Date, Surgery, and Length:  24, right foot synovectomy/bursectomy, 150 minutes    Patient presents to [facility] for perioperative risk assessment prior to scheduled surgery. She presents with right ankle ganglion cyst that has become increasingly bothersome.    This note was created in part upon personal review of patient's medical records.    Pt denies any past history of anesthetic complications such as PONV, awareness, prolonged sedation, dental damage, aspiration, cardiac arrest, difficult intubation, difficult I.V. access or unexpected hospital admissions.  No history of malignant hyperthermia and or pseudocholinesterase deficiency.    No history of blood transfusions.     The patient is not a [employer] and will accept blood and blood products if medically indicated. Type and screen not sent.    Past Medical History:   Diagnosis Date    Mass of right foot        Past Surgical History:   Procedure Laterality Date    FOOT SURGERY Right 2010    dislocated foot while working out    HIP SURGERY Left 2013    torn labrum    KNEE SURGERY Right 2008    dislocated knee cap/chip fracture    US GUIDED ASPIRATION OF ABSCESS, HEMATOMA, CYST  2023    US GUIDED ASPIRATION OF ABSCESS, HEMATOMA, CYST     Family History   Problem Relation Name Age of Onset    Osteopenia Mother      Hypertension Father      Stomach cancer Maternal Grandmother      Brain cancer Mother's Sister         Allergies   Allergen Reactions    Penicillins Hives and Itching     Social History     Tobacco Use   Smoking Status Never   Smokeless Tobacco Never     Social History     Substance and Sexual Activity   Alcohol Use Not Currently     Social History     Substance and Sexual Activity   Drug Use Never       Current Outpatient Medications:      "ferrous sulfate, 325 mg ferrous sulfate, tablet, Take 1 tablet by mouth once daily with breakfast., Disp: , Rfl:     multivitamin tablet, Take 1 tablet by mouth once daily., Disp: , Rfl:     levonorgestrel (Mirena) 21 mcg/24 hours (8 yrs) 52 mg IUD, by intrauterine route.  Inserted on 11/30/18, Disp: , Rfl:     Current Facility-Administered Medications:     triamcinolone acetonide (Kenalog) injection 40 mg, 40 mg, intralesional, Once, Telly Cline MD       PAT ROS:   Constitutional:   neg    Neuro/Psych:   neg    Eyes:   neg    Ears:   neg    Nose:   neg    Mouth:   neg    Throat:   neg    Neck:   neg    Cardio:   neg    Respiratory:   neg    Endocrine:   neg    GI:   neg    :   neg    Musculoskeletal:   neg    Hematologic:   neg    Skin:  neg        Physical Exam  Vitals reviewed. Physical exam within normal limits.          PAT AIRWAY:   Airway:     Mallampati::  II    Neck ROM::  Full   No broken teeth, no dentures and no missing teeth        Visit Vitals  /64   Pulse 76   Temp 36.6 °C (97.9 °F)   Resp 18   Ht 1.778 m (5' 10\")   Wt 77.6 kg (171 lb 1.2 oz)   SpO2 99%   BMI 24.55 kg/m²   Smoking Status Never   BSA 1.96 m²     Assessment and Plan:     Patient is  33 year old female scheduled for right foot synovectomy/bursectomy on 4/8/24 with Dr. Diggs.    Patient is at acceptable risk to proceed with planned surgical procedure. Further cardiac risk stratification deferred at this time.This patient is low risk candidate undergoing moderate risk procedure, patient is medically optimized for surgery    Plan    Cardiovascular:    Patient denies any chest pain, tightness, heaviness, pressure, radiating pain, palpitations, irregular heartbeats, lightheadedness, cough, congestion, shortness of breath, DENIS, PND, near syncope, weight loss or gain.    Good functional capacity    EKG in PAT not indicated.      RCRI: 0  Risk of Mace: 3.9%    Heme:  Patient instructed to ambulate as soon as possible " postoperatively to decrease thromboembolic risk.    Initiate mechanical DVT prophylaxis as soon as possible and initiate chemical prophylaxis when deemed safe from a bleeding standpoint post surgery.    Caprini=4      Risk assessment complete.  Patient is scheduled for a low surgical risk procedure.  She is considered medically optimized for the planned procedure.      Labs/testing obtained in PAT on 3/21/24: CBC, BMP    Lab Results   Component Value Date    WBC 9.3 03/21/2024    HGB 13.2 03/21/2024    HCT 40.5 03/21/2024    MCV 89 03/21/2024     03/21/2024     Lab Results   Component Value Date    GLUCOSE 106 (H) 03/21/2024    CALCIUM 9.9 03/21/2024     03/21/2024    K 3.9 03/21/2024    CO2 28 03/21/2024     03/21/2024    BUN 15 03/21/2024    CREATININE 0.92 03/21/2024       Follow up: none    Preoperative medication instructions were provided and reviewed with the patient.  Any additional testing or evaluation was explained to the patient.  Nothing by mouth instructions were discussed and patient's questions were answered prior to conclusion to this encounter.  Patient verbalized understanding of preoperative instructions given in preadmission testing; discharge instructions available in EMR.    This note was dictated with speech recognition.  Minor errors may have been detected during use of speech recognition.

## 2024-03-21 NOTE — PREPROCEDURE INSTRUCTIONS
Medication List            Accurate as of March 21, 2024 10:57 AM. Always use your most recent med list.                ferrous sulfate (325 mg ferrous sulfate) tablet  Medication Adjustments for Surgery: Stop 1 day before surgery     Mirena 21 mcg/24 hours (8 yrs) 52 mg IUD  Generic drug: levonorgestrel     multivitamin tablet  Medication Adjustments for Surgery: Stop 7 days before surgery            CONTACT SURGEON'S OFFICE IF YOU DEVELOP:  * Fever = 100.4 F   * New respiratory symptoms (e.g. cough, shortness of breath, respiratory distress, sore throat)  * Recent loss of taste or smell  *Flu like symptoms such as headache, fatigue or gastrointestinal symptoms  * You develop any open sores, shingles, burning or painful urination   AND/OR:  * You no longer wish to have the surgery.  * Any other personal circumstances change that may lead to the need to cancel or defer this surgery.  *You were admitted to any hospital within one week of your planned procedure.    SMOKING:  *Quitting smoking can make a huge difference to your health and recovery from surgery.    *If you need help with quitting, call 3-834-QUIT-NOW.    THE DAY BEFORE SURGERY:  *Do not eat any food after midnight the night before your surgery.   *You may have up to TEN OUNCES of clear liquids until TWO hours before your instructed ARRIVAL TIME to hospital. This includes water, black tea/coffee, (no milk or cream) apple juice, clear broth and electrolyte drinks (Gatorade). Please avoid clear liquids that are red in color.   *You may chew gum/mints up to TWO hours before your surgery/procedure.    SURGICAL TIME:  *You will be contacted between 2 p.m. and 6 p.m. the business day before your surgery with your arrival time.  *If you haven't received a call by 6pm, call [phone].  *Scheduled surgery times may change and you will be notified if this occurs-check your personal voicemail for any updates.    ON THE MORNING OF SURGERY:  *Wear comfortable,  loose fitting clothing.   *Do not use moisturizers, creams, lotions or perfume.  *All jewelry and valuables should be left at home.  *Prosthetic devices such as contact lenses, hearing aids, dentures, eyelash extensions, hairpins and body piercing must be removed before surgery.    BRING WITH YOU:  *Photo ID and insurance card  *Current list of medications and allergies  *Pacemaker/Defibrillator/Heart stent cards  *CPAP machine and mask  *Slings/splints/crutches  *Copy of your complete Advanced Directive/DHPOA-if applicable  *Neurostimulator implant remote    PARKING AND ARRIVAL:  *Check in at the Main Entrance desk and let them know you are here for surgery.  *You will be directed to the 2nd floor surgical waiting area.    IF YOU ARE HAVING OUTPATIENT/SAME DAY SURGERY:  *A responsible adult MUST accompany you at the time of discharge and stay with you for 24 hours after your surgery.  *You may NOT drive yourself home after surgery.  *You may use a taxi or ride sharing service ([employer], Uber) to return home ONLY if you are accompanied by a friend or family member.  *Instructions for resuming your medications will be provided by your surgeon.

## 2024-04-08 ENCOUNTER — ANESTHESIA EVENT (OUTPATIENT)
Dept: OPERATING ROOM | Facility: HOSPITAL | Age: 34
End: 2024-04-08
Payer: COMMERCIAL

## 2024-04-08 ENCOUNTER — HOSPITAL ENCOUNTER (OUTPATIENT)
Facility: HOSPITAL | Age: 34
Setting detail: OUTPATIENT SURGERY
Discharge: HOME | End: 2024-04-08
Attending: STUDENT IN AN ORGANIZED HEALTH CARE EDUCATION/TRAINING PROGRAM | Admitting: STUDENT IN AN ORGANIZED HEALTH CARE EDUCATION/TRAINING PROGRAM
Payer: COMMERCIAL

## 2024-04-08 ENCOUNTER — ANESTHESIA (OUTPATIENT)
Dept: OPERATING ROOM | Facility: HOSPITAL | Age: 34
End: 2024-04-08
Payer: COMMERCIAL

## 2024-04-08 VITALS
HEIGHT: 70 IN | WEIGHT: 171.08 LBS | TEMPERATURE: 97.5 F | BODY MASS INDEX: 24.49 KG/M2 | HEART RATE: 77 BPM | OXYGEN SATURATION: 100 % | SYSTOLIC BLOOD PRESSURE: 126 MMHG | DIASTOLIC BLOOD PRESSURE: 73 MMHG | RESPIRATION RATE: 14 BRPM

## 2024-04-08 DIAGNOSIS — R22.41 MASS OF RIGHT FOOT: Primary | ICD-10-CM

## 2024-04-08 LAB — PREGNANCY TEST URINE, POC: NEGATIVE

## 2024-04-08 PROCEDURE — 99024 POSTOP FOLLOW-UP VISIT: CPT | Performed by: STUDENT IN AN ORGANIZED HEALTH CARE EDUCATION/TRAINING PROGRAM

## 2024-04-08 PROCEDURE — 88304 TISSUE EXAM BY PATHOLOGIST: CPT | Performed by: PATHOLOGY

## 2024-04-08 PROCEDURE — 3700000001 HC GENERAL ANESTHESIA TIME - INITIAL BASE CHARGE: Performed by: STUDENT IN AN ORGANIZED HEALTH CARE EDUCATION/TRAINING PROGRAM

## 2024-04-08 PROCEDURE — 28090 REMOVAL OF FOOT LESION: CPT | Performed by: STUDENT IN AN ORGANIZED HEALTH CARE EDUCATION/TRAINING PROGRAM

## 2024-04-08 PROCEDURE — A28090 PR EXCIS TENDN/CAPSULE LESN,FOOT: Performed by: ANESTHESIOLOGIST ASSISTANT

## 2024-04-08 PROCEDURE — 2500000004 HC RX 250 GENERAL PHARMACY W/ HCPCS (ALT 636 FOR OP/ED): Performed by: STUDENT IN AN ORGANIZED HEALTH CARE EDUCATION/TRAINING PROGRAM

## 2024-04-08 PROCEDURE — 2720000007 HC OR 272 NO HCPCS: Performed by: STUDENT IN AN ORGANIZED HEALTH CARE EDUCATION/TRAINING PROGRAM

## 2024-04-08 PROCEDURE — A4217 STERILE WATER/SALINE, 500 ML: HCPCS | Performed by: STUDENT IN AN ORGANIZED HEALTH CARE EDUCATION/TRAINING PROGRAM

## 2024-04-08 PROCEDURE — A28090 PR EXCIS TENDN/CAPSULE LESN,FOOT: Performed by: STUDENT IN AN ORGANIZED HEALTH CARE EDUCATION/TRAINING PROGRAM

## 2024-04-08 PROCEDURE — 2500000005 HC RX 250 GENERAL PHARMACY W/O HCPCS: Performed by: ANESTHESIOLOGIST ASSISTANT

## 2024-04-08 PROCEDURE — 2500000004 HC RX 250 GENERAL PHARMACY W/ HCPCS (ALT 636 FOR OP/ED): Performed by: ANESTHESIOLOGIST ASSISTANT

## 2024-04-08 PROCEDURE — 7100000001 HC RECOVERY ROOM TIME - INITIAL BASE CHARGE: Performed by: STUDENT IN AN ORGANIZED HEALTH CARE EDUCATION/TRAINING PROGRAM

## 2024-04-08 PROCEDURE — 81025 URINE PREGNANCY TEST: CPT | Performed by: STUDENT IN AN ORGANIZED HEALTH CARE EDUCATION/TRAINING PROGRAM

## 2024-04-08 PROCEDURE — 64445 NJX AA&/STRD SCIATIC NRV IMG: CPT | Performed by: STUDENT IN AN ORGANIZED HEALTH CARE EDUCATION/TRAINING PROGRAM

## 2024-04-08 PROCEDURE — 7100000002 HC RECOVERY ROOM TIME - EACH INCREMENTAL 1 MINUTE: Performed by: STUDENT IN AN ORGANIZED HEALTH CARE EDUCATION/TRAINING PROGRAM

## 2024-04-08 PROCEDURE — 2500000005 HC RX 250 GENERAL PHARMACY W/O HCPCS: Performed by: STUDENT IN AN ORGANIZED HEALTH CARE EDUCATION/TRAINING PROGRAM

## 2024-04-08 PROCEDURE — 7100000009 HC PHASE TWO TIME - INITIAL BASE CHARGE: Performed by: STUDENT IN AN ORGANIZED HEALTH CARE EDUCATION/TRAINING PROGRAM

## 2024-04-08 PROCEDURE — 7100000010 HC PHASE TWO TIME - EACH INCREMENTAL 1 MINUTE: Performed by: STUDENT IN AN ORGANIZED HEALTH CARE EDUCATION/TRAINING PROGRAM

## 2024-04-08 PROCEDURE — 3600000007 HC OR TIME - EACH INCREMENTAL 1 MINUTE - PROCEDURE LEVEL TWO: Performed by: STUDENT IN AN ORGANIZED HEALTH CARE EDUCATION/TRAINING PROGRAM

## 2024-04-08 PROCEDURE — 0752T DGTZ GLS MCRSCP SLD LVL III: CPT | Mod: TC,AHULAB | Performed by: STUDENT IN AN ORGANIZED HEALTH CARE EDUCATION/TRAINING PROGRAM

## 2024-04-08 PROCEDURE — 3600000002 HC OR TIME - INITIAL BASE CHARGE - PROCEDURE LEVEL TWO: Performed by: STUDENT IN AN ORGANIZED HEALTH CARE EDUCATION/TRAINING PROGRAM

## 2024-04-08 PROCEDURE — 3700000002 HC GENERAL ANESTHESIA TIME - EACH INCREMENTAL 1 MINUTE: Performed by: STUDENT IN AN ORGANIZED HEALTH CARE EDUCATION/TRAINING PROGRAM

## 2024-04-08 RX ORDER — MIDAZOLAM HYDROCHLORIDE 1 MG/ML
INJECTION INTRAMUSCULAR; INTRAVENOUS AS NEEDED
Status: DISCONTINUED | OUTPATIENT
Start: 2024-04-08 | End: 2024-04-08

## 2024-04-08 RX ORDER — DOCUSATE SODIUM 100 MG/1
100 CAPSULE, LIQUID FILLED ORAL 2 TIMES DAILY
Qty: 20 CAPSULE | Refills: 0 | Status: SHIPPED | OUTPATIENT
Start: 2024-04-08 | End: 2024-05-07 | Stop reason: WASHOUT

## 2024-04-08 RX ORDER — ASPIRIN 325 MG
325 TABLET, DELAYED RELEASE (ENTERIC COATED) ORAL 2 TIMES DAILY
Qty: 60 TABLET | Refills: 0 | Status: SHIPPED | OUTPATIENT
Start: 2024-04-08 | End: 2024-05-07 | Stop reason: WASHOUT

## 2024-04-08 RX ORDER — FENTANYL CITRATE 50 UG/ML
INJECTION, SOLUTION INTRAMUSCULAR; INTRAVENOUS AS NEEDED
Status: DISCONTINUED | OUTPATIENT
Start: 2024-04-08 | End: 2024-04-08

## 2024-04-08 RX ORDER — ONDANSETRON HYDROCHLORIDE 2 MG/ML
4 INJECTION, SOLUTION INTRAVENOUS ONCE AS NEEDED
Status: DISCONTINUED | OUTPATIENT
Start: 2024-04-08 | End: 2024-04-08 | Stop reason: HOSPADM

## 2024-04-08 RX ORDER — BUPIVACAINE HCL/EPINEPHRINE 0.5-1:200K
VIAL (ML) INJECTION AS NEEDED
Status: DISCONTINUED | OUTPATIENT
Start: 2024-04-08 | End: 2024-04-08

## 2024-04-08 RX ORDER — SODIUM CHLORIDE 0.9 G/100ML
IRRIGANT IRRIGATION AS NEEDED
Status: DISCONTINUED | OUTPATIENT
Start: 2024-04-08 | End: 2024-04-08 | Stop reason: HOSPADM

## 2024-04-08 RX ORDER — LIDOCAINE HYDROCHLORIDE 20 MG/ML
INJECTION, SOLUTION INFILTRATION; PERINEURAL AS NEEDED
Status: DISCONTINUED | OUTPATIENT
Start: 2024-04-08 | End: 2024-04-08

## 2024-04-08 RX ORDER — OXYCODONE HYDROCHLORIDE 5 MG/1
5 TABLET ORAL EVERY 6 HOURS PRN
Qty: 20 TABLET | Refills: 0 | Status: SHIPPED | OUTPATIENT
Start: 2024-04-08 | End: 2024-04-13

## 2024-04-08 RX ORDER — DIPHENHYDRAMINE HYDROCHLORIDE 50 MG/ML
12.5 INJECTION INTRAMUSCULAR; INTRAVENOUS ONCE AS NEEDED
Status: DISCONTINUED | OUTPATIENT
Start: 2024-04-08 | End: 2024-04-08 | Stop reason: HOSPADM

## 2024-04-08 RX ORDER — PROPOFOL 10 MG/ML
INJECTION, EMULSION INTRAVENOUS AS NEEDED
Status: DISCONTINUED | OUTPATIENT
Start: 2024-04-08 | End: 2024-04-08

## 2024-04-08 RX ORDER — CEFAZOLIN SODIUM 2 G/100ML
2 INJECTION, SOLUTION INTRAVENOUS ONCE
Status: DISCONTINUED | OUTPATIENT
Start: 2024-04-08 | End: 2024-04-08 | Stop reason: HOSPADM

## 2024-04-08 RX ORDER — SODIUM CHLORIDE, SODIUM LACTATE, POTASSIUM CHLORIDE, CALCIUM CHLORIDE 600; 310; 30; 20 MG/100ML; MG/100ML; MG/100ML; MG/100ML
100 INJECTION, SOLUTION INTRAVENOUS CONTINUOUS
Status: DISCONTINUED | OUTPATIENT
Start: 2024-04-08 | End: 2024-04-08 | Stop reason: HOSPADM

## 2024-04-08 RX ORDER — LIDOCAINE HYDROCHLORIDE 10 MG/ML
0.1 INJECTION, SOLUTION EPIDURAL; INFILTRATION; INTRACAUDAL; PERINEURAL ONCE
Status: DISCONTINUED | OUTPATIENT
Start: 2024-04-08 | End: 2024-04-08 | Stop reason: HOSPADM

## 2024-04-08 RX ORDER — LABETALOL HYDROCHLORIDE 5 MG/ML
5 INJECTION, SOLUTION INTRAVENOUS ONCE AS NEEDED
Status: DISCONTINUED | OUTPATIENT
Start: 2024-04-08 | End: 2024-04-08 | Stop reason: HOSPADM

## 2024-04-08 RX ORDER — CEFAZOLIN 1 G/1
INJECTION, POWDER, FOR SOLUTION INTRAVENOUS AS NEEDED
Status: DISCONTINUED | OUTPATIENT
Start: 2024-04-08 | End: 2024-04-08

## 2024-04-08 RX ORDER — ONDANSETRON HYDROCHLORIDE 2 MG/ML
INJECTION, SOLUTION INTRAVENOUS AS NEEDED
Status: DISCONTINUED | OUTPATIENT
Start: 2024-04-08 | End: 2024-04-08

## 2024-04-08 RX ORDER — OXYCODONE HYDROCHLORIDE 5 MG/1
5 TABLET ORAL EVERY 4 HOURS PRN
Status: DISCONTINUED | OUTPATIENT
Start: 2024-04-08 | End: 2024-04-08 | Stop reason: HOSPADM

## 2024-04-08 RX ADMIN — DEXAMETHASONE SODIUM PHOSPHATE 4 MG: 4 INJECTION, SOLUTION INTRAMUSCULAR; INTRAVENOUS at 08:42

## 2024-04-08 RX ADMIN — MIDAZOLAM HYDROCHLORIDE 4 MG: 1 INJECTION, SOLUTION INTRAMUSCULAR; INTRAVENOUS at 07:24

## 2024-04-08 RX ADMIN — Medication 30 ML: at 07:25

## 2024-04-08 RX ADMIN — LIDOCAINE HYDROCHLORIDE 60 MG: 20 INJECTION, SOLUTION INFILTRATION; PERINEURAL at 07:34

## 2024-04-08 RX ADMIN — CEFAZOLIN 2 G: 330 INJECTION, POWDER, FOR SOLUTION INTRAMUSCULAR; INTRAVENOUS at 07:41

## 2024-04-08 RX ADMIN — ONDANSETRON 4 MG: 2 INJECTION INTRAMUSCULAR; INTRAVENOUS at 08:42

## 2024-04-08 RX ADMIN — SODIUM CHLORIDE, POTASSIUM CHLORIDE, SODIUM LACTATE AND CALCIUM CHLORIDE 100 ML/HR: 600; 310; 30; 20 INJECTION, SOLUTION INTRAVENOUS at 06:45

## 2024-04-08 RX ADMIN — PROPOFOL 200 MG: 10 INJECTION, EMULSION INTRAVENOUS at 07:34

## 2024-04-08 RX ADMIN — FENTANYL CITRATE 50 MCG: 50 INJECTION, SOLUTION INTRAMUSCULAR; INTRAVENOUS at 07:50

## 2024-04-08 RX ADMIN — FENTANYL CITRATE 100 MCG: 50 INJECTION, SOLUTION INTRAMUSCULAR; INTRAVENOUS at 07:24

## 2024-04-08 ASSESSMENT — PAIN SCALES - GENERAL
PAINLEVEL_OUTOF10: 0 - NO PAIN

## 2024-04-08 ASSESSMENT — PAIN DESCRIPTION - DESCRIPTORS
DESCRIPTORS: NUMBNESS

## 2024-04-08 ASSESSMENT — PAIN - FUNCTIONAL ASSESSMENT
PAIN_FUNCTIONAL_ASSESSMENT: 0-10

## 2024-04-08 ASSESSMENT — COLUMBIA-SUICIDE SEVERITY RATING SCALE - C-SSRS
2. HAVE YOU ACTUALLY HAD ANY THOUGHTS OF KILLING YOURSELF?: NO
6. HAVE YOU EVER DONE ANYTHING, STARTED TO DO ANYTHING, OR PREPARED TO DO ANYTHING TO END YOUR LIFE?: NO
1. IN THE PAST MONTH, HAVE YOU WISHED YOU WERE DEAD OR WISHED YOU COULD GO TO SLEEP AND NOT WAKE UP?: NO

## 2024-04-08 NOTE — ANESTHESIA PREPROCEDURE EVALUATION
Patient: Cristina Bowery-Lombardo    Procedure Information       Date/Time: 04/08/24 0730    Procedure: Right Foot Synovectomy; Bursectomy (Right: Foot)    Location: [facility] A OR 11 / [facility] A OR    Surgeons: Hitesh Diggs MD            Relevant Problems   No relevant active problems       Clinical information reviewed:   Tobacco  Allergies  Meds   Med Hx  Surg Hx  OB Status  Fam Hx  Soc   Hx        NPO Detail:  NPO/Void Status  Carbohydrate Drink Given Prior to Surgery? : N  Date of Last Liquid: 04/08/24  Time of Last Liquid: 0340  Date of Last Solid: 04/07/24  Time of Last Solid: 1815  Last Intake Type: Clear fluids (water)  Time of Last Void: 0622         Physical Exam    Airway  Mallampati: II  TM distance: >3 FB  Neck ROM: full     Cardiovascular   Rhythm: regular  Rate: normal     Dental    Pulmonary   Breath sounds clear to auscultation     Abdominal            Anesthesia Plan    History of general anesthesia?: yes  History of complications of general anesthesia?: no    ASA 1     general     intravenous induction   Anesthetic plan and risks discussed with patient.    Plan discussed with CRNA.

## 2024-04-08 NOTE — BRIEF OP NOTE
Date: 2024  OR Location: [facility] OR    Name: Cristina Bowery-Lombardo, : 1990, Age: 33 y.o., MRN: [id number], Sex: female    Diagnosis  Pre-op Diagnosis     * Mass of right foot [R22.41] Post-op Diagnosis     * Mass of right foot [R22.41]     Procedures  Right Foot Synovectomy; Bursectomy  44546 - PA EXC LESION TENDON SHEATH/CAPSULE W/SYNVCT FOOT      Surgeons      * Hitesh Diggs - Primary    Resident/Fellow/Other Assistant:  Surgeon(s) and Role:     * Adam Walsh DO - Resident - Assisting    Procedure Summary  Anesthesia: Consult  ASA: I  Anesthesia Staff: Anesthesiologist: Feliciano Yepez MD  C-AA: SIDNEY Wahl  Estimated Blood Loss: <5 mL  Intra-op Medications:   Administrations occurring from 0730 to 1000 on 24:   Medication Name Total Dose   sodium chloride 0.9 % irrigation solution 1,000 mL              Anesthesia Record               Intraprocedure I/O Totals       None           Specimen:   ID Type Source Tests Collected by Time   1 : Right Foot Mass Tissue SOFT TISSUE MASS RESECTION SURGICAL PATHOLOGY EXAM Hitesh Diggs MD 2024 08        Staff:   Circulator: Priyanka Huerta RN  Scrub Person: Danyel Myers          Findings: consistent with diagnosis, specimen sent, communication to subtalar joint    Complications:  None; patient tolerated the procedure well.     Disposition: PACU - hemodynamically stable.  Condition: stable  Specimens Collected:   ID Type Source Tests Collected by Time   1 : Right Foot Mass Tissue SOFT TISSUE MASS RESECTION SURGICAL PATHOLOGY EXAM Hitesh Diggs MD 2024 08     Attending Attestation: I was present and scrubbed for the entire procedure.    Hitesh Diggs MD, BRENNA  Department of Orthopaedic Surgery  [facility]

## 2024-04-08 NOTE — ANESTHESIA PROCEDURE NOTES
Peripheral Block    Patient location during procedure: pre-op  Start time: 4/8/2024 7:15 AM  End time: 4/8/2024 7:20 AM  Reason for block: at surgeon's request and post-op pain management  Staffing  Performed: attending   Authorized by: Feliciano Yepez MD    Performed by: Feliciano Yepez MD  Preanesthetic Checklist  Completed: patient identified, IV checked, site marked, risks and benefits discussed, surgical consent, monitors and equipment checked, pre-op evaluation and timeout performed   Timeout performed at:   Peripheral Block  Patient position: laying flat  Prep: ChloraPrep and site prepped and draped  Patient monitoring: continuous pulse ox, heart rate and cardiac monitor  Block type: popliteal  Laterality: right  Injection technique: single-shot  Guidance: ultrasound guided  Local infiltration: lidocaine  Infiltration strength: 1 %  Dose: 3 mL  Needle  Needle type: short-bevel   Needle gauge: 22 G  Needle length: 8 cm  Needle localization: ultrasound guidance (Image saved in chart)  Test dose: negative  Assessment  Injection assessment: negative aspiration for heme, local visualized surrounding nerve on ultrasound, no paresthesia on injection and incremental injection  Heart rate change: no  Slow fractionated injection: yes  Additional Notes  Pre-medication with Versed 2 mg intravenously.  Verbal consent was provided by the patient and/or surrogate decision maker after a discussion of risks and benefits. Anticoagulation (if any) was held per MADAN guidelines.  Aseptic prep and draping was performed. 30 ml of 0.375% bupivacaine with epi 5 mcg/ml and decadron 4 mg was injected using a 22 G stimuplex needle under dynamic ultrasound guidance.There was no resistance to injection and appropriate injection pressures were maintained based on tactile feedback. Throughout the procedure, there was consistent and meaningful verbal communication with the patient. Post procedure vital signs were stable and no  immediate complications were noted. PACU will provide written instructions that outline the specific precautions to be taken when caring for an insensate extremity.

## 2024-04-08 NOTE — H&P
Reason for Surgery: right ankle mass   Planned Procedure: open excision of right lateral ankle mass    History & Physical Reviewed:  I have reviewed the History and Physical for obtained within the last 30 days. Relevant findings and updates are noted below:  No significant changes.    Home medications were reviewed with significant updates noted below:  No significant changes.    ERAS patient?: No    COVID-19 Risk Consent:   Surgeon has reviewed the key risks related to jose COVID-19 and subsequent sequelae.     04/08/24 at 6:13 AM - DO Hitesh Nice MD, BRENNA  Department of Orthopaedic Surgery  [facility]

## 2024-04-08 NOTE — DISCHARGE INSTRUCTIONS
"POST-OPERATIVE DISCHARGE INSTRUCTIONS:    ACTIVITY:    Partial Weightbearing RLE in a Tall Walking Boot     You are advised to go home directly from the hospital or surgical center. Restrict your activities.    Return to school/work will be determined at next clinic visit, unless previously discussed with the surgical team     BLOOD CLOT PREVENTION:    To prevent blood clot formation, you have been prescribed ASA to be taken as prescribed until your surgeon or his Physician's Assistant (PA) states you can stop taking it.    Moving around and walking (with crutches) helps decrease the risk of blood clots. You must get up and \"move around\" once every hour, unless your are sleeping.    While you are sleeping and when you are not being active, continue to keep your leg elevated as much as possible.    It is common to have swelling in your feet after surgery for even a year afterwards, so the more you keep the leg elevated after surgery, the less swelling you will have later on.     GENERAL INSTRUCTIONS:  1.  Keep your surgical site elevated above your heart for at least 7 days or longer to prevent swelling (a good way to remember this is \"toes above nose\"). This will improve your comfort and your overall recovery following surgery.  Avoid pressure under the heel and keep the heel clear to avoid ulceration     2. Be alert for signs of infection including redness, streaking, odor, fever or chills. Be alert for excessive pain or bleeding and notify your surgeon immediately. Also, notify your surgeon of nausea, vomiting, or chills, numbness or weakness, bleeding, redness, swelling, pain, or drainage from surgical incision(s), bowel or bladder dysfunction, severe pain not relieved by pain medications, temperature greater than 101.0 F (38.3 C) degrees.    3. If you smoke (or have smoked within the last year), we strongly recommend that you do not smoke. This may lead to increased risk of wound infection and will decrease your " chances of healing (bone, soft tissue, etc).     WOUND INSTRUCTIONS:    Leave your Tall Walking Boot until your post operative visit.  Keep it clean and dry.     Always keep the incision clean and dry until the staples/sutures are removed. If there is no drainage from the incision you should keep it open to air. If there is drainage from the incision you must keep it covered at all times until the drainage stops.    You may shower carefully (avoid falling) but the incision must be covered with Tegaderm or a water proof dressing so the wound does not get wet; once the staples/sutures are removed, the Tegaderm is no longer necessary and the wound may be washed with soap and water.     If you do not have sutures that need to be removed  then the incision should remain clean and dry for a minimum of 14 days.    Do not soak in a bath tub, hot tub, pool, lake or other body of water until atleast 21 days after your surgery and your incision is completely dry and healed. Or until approved by Dr. Diggs.    If you have removable sutures (or staples) they will be removed in approximately 14-21 days (unless otherwise instructed) from the day of your surgery.     If you have a fiberglass cast or splint in place, please consider the following instructions:  1)  Elevate the extremity as much as possible.  2)  Keep the cast or splint clean and dry.  3)  Please call us if the cast becomes wet or dirty.  4)  If you are experiencing worsening pain or worsening swelling, please call.  5)  Itching can be bothersome.  You can try:  - Scratching the opposite limb in the same spot.  - Running air through the cast or splint with a blow dryer on cold.  - Do NOT stick anything inside the cast as it may become lodged    DIET:    Return to your regular diet as tolerated. Begin with light or bland foods. Drink plenty of fluids.      MEDICATIONS:    Resume all previous home medications at the previous prescribed dose and frequency unless  "otherwise noted    PAIN CONTROL:     For pain control, you have been prescribed medications.    Narcotic medication in the form of oxycodone. Take as prescribed and wean as able. Do not take with medications that are sedating, especially benzodiazepines.  You may take Ibuprofen (800mg every 6hrs) and Tylenol (500mg every 6hrs) for the first three days only.   - Alternate/stagger these two medications so that you're not taking them both at the same time. Take as prescribed for the first three days then stop when prescriptions done. This will provide a strong anti-inflammatory effect for the first few days.  3. The anesthesia block will wear off eventually. Some patients it will last only a few hours after surgery and in others it may last a few days. As soon as you start feeling any sensation at all in your leg, start taking the medications, so that you're not caught playing \"catch up\".   4. Remember to keep elevated. Avoid using ice while your extremity nerve block is still working. If you can't feel your leg and it is still numb, then the ice may induce injury. Ice behind the knee can help as well. You may apply ice to your cast/splint/dressing for the first few days. 20 minutes of icing followed by 2 hours of no ice. Keep the splint/cast/dressing dry and place a towel between the ice and the dressing. DO NOT GET YOUR DRESSING/SPLINT/CAST WET!  5. While taking narcotics, you may have constipation.  A stool softener is recommended. You may use an over-the-counter stool softener or use the one that has been prescribed for you. Colace and Senokot are common over-the-counter medications.  6. Do not drink alcohol or drive while using narcotic pain medication.  7. Do NOT take additional tylenol if your pain medication already has tylenol in it     IMPORTANT INFORMATION:    Please call your surgeon' s clinic with questions Monday-Friday during business hours. If no one answers, please leave a message and someone should " get back to the patient within 24 hours.  For after-hours calls, please call the  and request to contact the answering service for Dr. Diggs.   For emergencies, call 911 or present to the nearest ER for immediate evaluation.     FOLLOWUP INSTRUCTIONS:    Please contact [phone] to confirm scheduled follow-up appointment or to make changes to your scheduled appointment.

## 2024-04-08 NOTE — ANESTHESIA POSTPROCEDURE EVALUATION
Patient: Cristina Bowery-Lombardo    Procedure Summary       Date: 04/08/24 Room / Location: [facility] A OR 11 / [facility]    Anesthesia Start: 0729 Anesthesia Stop: 0857    Procedure: Right Foot Synovectomy; Bursectomy (Right: Foot) Diagnosis:       Mass of right foot      (Mass of right foot [R22.41])    Surgeons: Hietsh Diggs MD Responsible Provider: Feliciano Yepez MD    Anesthesia Type: general ASA Status: 1            Anesthesia Type: general    Vitals Value Taken Time   /66 04/08/24 0931   Temp 36.5 °C (97.7 °F) 04/08/24 0849   Pulse 62 04/08/24 0936   Resp 10 04/08/24 0937   SpO2 100 % 04/08/24 0939   Vitals shown include unvalidated device data.    Anesthesia Post Evaluation    Patient location during evaluation: bedside  Patient participation: complete - patient participated  Level of consciousness: awake  Pain management: adequate  Multimodal analgesia pain management approach  Airway patency: patent  Cardiovascular status: stable  Respiratory status: spontaneous ventilation and unassisted  Hydration status: acceptable  Postoperative Nausea and Vomiting: none  Comments: No significant PONV.        No notable events documented.

## 2024-04-08 NOTE — OP NOTE
Right Foot Synovectomy; Bursectomy (R) Operative Note     Date: 2024  OR Location: [facility] A OR    Name: Cristina Bowery-Lombardo, YOB: 1990, Age: 33 y.o., MRN: [id number], Sex: female    Diagnosis  Pre-op Diagnosis     * Mass of right foot [R22.41] Post-op Diagnosis     * Mass of right foot [R22.41]     Procedures  Right Foot Synovectomy; Bursectomy  28334 - HI EXC LESION TENDON SHEATH/CAPSULE W/SYNVCT FOOT      Surgeons      * Hitesh Diggs - Primary    Resident/Fellow/Other Assistant:  Surgeon(s) and Role:     * Adam Walsh DO - Resident - Assisting    Procedure Summary  Anesthesia: Consult  ASA: I  Anesthesia Staff: Anesthesiologist: Feliciano Yepez MD  C-AA: SIDNEY Wahl  Estimated Blood Loss: <5 mL  Intra-op Medications:   Administrations occurring from 0730 to 1000 on 24:   Medication Name Total Dose   sodium chloride 0.9 % irrigation solution 1,000 mL   lactated Ringer's infusion 1,116.67 mL   oxygen (O2) therapy Cannot be calculated              Anesthesia Record               Intraprocedure I/O Totals          Output    Est. Blood Loss 5 mL    Total Output 5 mL          Specimen:   ID Type Source Tests Collected by Time   1 : Right Foot Mass Tissue SOFT TISSUE MASS RESECTION SURGICAL PATHOLOGY EXAM Hitesh Diggs MD 2024 0801        Staff:   Circulator: Priyanka Huerta RN  Scrub Person: Danyel Myers         Drains and/or Catheters: * None in log *    Tourniquet Times:     Total Tourniquet Time Documented:  Thigh (Right) - 51 minutes  Total: Thigh (Right) - 51 minutes      Findings: consistent with diagnosis    Patient Name: Cristina Bowery-Lombardo  MRN: [id number]  YOB: 1990  Date of Service: 24  Report Type: Operative    PREOPERATIVE DIAGNOSIS:    Right Foot Mass    POSTOPERATIVE DIAGNOSIS:    Right Foot Mass    OPERATION/PROCEDURE:    Right Foot Mass Excision    SURGEON:  Hitesh Diggs MD    ASSISTANT(S):  Adam Walsh DO  (PGY-III)    ANESTHESIA:  General    ESTIMATED BLOOD LOSS: <5 cc    COMPLICATIONS: None apparent    DISPOSITION: PACU/home    BRIEF CLINICAL HISTORY: 33-year-old female who presented with right foot mass.  The mass was not initially associated with any specific pain or discomfort.  She had not noticed any particular change in size of the mass.  Over time, she did develop some discomfort as well as pressure and swelling, typically with activity.  I had obtained an MRI with and without contrast that was suggestive of ganglion cyst that communicated with the subtalar joint.  In light of her relatively minimal symptoms at that time, she did attempt an aspiration under ultrasound, unfortunately mass recurred approximately 10 days later.  On my evaluation of the patient she had a soft, mobile mass overlying the sinus Tarsi.  There appeared to be multiple palpable segments.  She was nontender to palpation and pain was not reproduced with subtalar joint range of motion nor peroneal tendon motion.  I had a long discussion with the patient regarding treatment options, given her persistent discomfort and that she had failed and appropriate and exhaustive course of nonoperative treatment including clinical monitoring with worsening of symptoms over time and a failed attempt for aspiration I did recommend that she undergo a right foot mass excisional biopsy. I discussed that while clinical and radiographic features are suggestive of ganglion cyst, definitive pathologic analysis would be required and ultimately dictate final treatment.  I also reviewed that given the relative proximity to the subtalar joint that there may be an increased risk of recurrence as well as potential for injury to the subtalar joint.  Further risks included but were not limited to infection, wound healing complications, need for further surgery, persistent or worsening pain, postoperative stiffness, bleeding, blood loss requiring a blood transfusion,  neurovascular injury, failure of the procedure, complications of anesthesia, stroke, DVT/PE, myocardial infarction and death.  Benefits included decreased pain and irritation, improved function and obtaining a tissue diagnosis.  Alternatives include continued conservative management.  Patient voiced understanding the risk, benefits and alternatives and the informed consent was signed with both myself and the patient present.    OPERATIVE REPORT: On the day of surgery 04/08/2024, the patient was met in the preoperative holding area by members of the orthopedic, anesthesia and nursing teams.  I marked the patient's right lower extremity with indelible ink with the patient as my witness.  The informed consent was again reviewed.  All remaining questions were answered.  In the preoperative holding area, the anesthesia team performed a popliteal block. The patient had been previously medically optimized for surgery by ESE.    The patient was then brought back to the operating room on [facility].  I led a preoperative timeout, verifying the correct patient, procedure and laterality of procedure to be performed.  We confirmed the appropriate availability of all surgical equipment, and confirmed the administration of pre-surgical IV antibiotic prophylaxis within 1 hour of incision time, 2 g Ancef.  All present were in agreement.    Care was handed over to the anesthesia team who provided GETA.  The patient was then transferred onto the operating room table in the supine position.  All bony prominences were padded and an SCD was placed on the contra-lateral extremity. A nonsterile, well-padded thigh tourniquet was placed.  The patient's right lower extremity was then prepped and draped in usual sterile fashion with sterile prep with ChloraPrep.    I marked out a standard lateral approach to the sinus Tarsi in line with the fourth metatarsal.  I led a preprocedure pause verifying the correct patient, procedure and  laterality of procedure to be performed.  All present were in agreement.  The patient's right lower extremity was then exsanguinated with the use of an Esmarch and the tourniquet was inflated to 275 mmHg.  I incised through skin and subcutaneous tissue.  Meticulous hemostasis was achieved.  The pseudocapsule of the foot mass was readily identified and circumferential mobilization was undertaken with sharp and blunt dissection to attempt to remove the mass en bloc. There was gelatinous appearing material within the mass that was sent for analysis.  There was a stalk of the mass that appeared to emanate from the subtalar joint.    The remnant pseudocapsule was then circumferentially mobilized from the surrounding adherent tissue and remained densely adherent to the subtalar joint capsule.  The mass was then truncated from the apparent stalk and great care was taken to avoid injury to the subtalar joint cartilage which appeared without identifiable injury.  The gelatinous tissue and all readily identifiable mass was then sent for pathologic analysis.  This concluded the mass removal portion of the procedure.  The joint capsule was cauterized with Bovie to prevent recurrence.    The wounds were copiously irrigated and closed in layers.  3-0 Vicryl was then utilized to close the subtalar joint capsule and deep fascia.  The deep dermal layer was then closed with 3-0 Vicryl in a simple buried interrupted fashion with 3-0 nylon in a simple interrupted fashion for skin. The skin was cleansed and dried and dry sterile dressings were placed.  The sterile drapes were removed.  The patient was then placed into a walking boot.  By this time the tourniquet had been released and there was excellent reperfusion of the extremity noted with palpable 2+ DP pulses.    All surgical counts were noted to be correct. The patient was then awoken from anesthesia without complication and transferred from the operating room table onto the  [facility] and then brought back to the PACU in stable condition.    Post-Operative Plan:   The patient will remain weightbearing as tolerated in the right lower extremity in a tall walking boot.  I encouraged the patient to walk for necessity only.  She will begin aspirin for DVT prophylaxis.  I have encouraged extremity elevation as tolerated.  I will plan to see the patient back in approximate 2 weeks for the first postoperative visit.    Complications:  None; patient tolerated the procedure well.    Disposition: PACU - hemodynamically stable.  Condition: stable     Attending Attestation: I was present and scrubbed for the entire procedure.    Hitesh Diggs MD, BRENNA  Department of Orthopaedic Surgery  [facility]

## 2024-04-08 NOTE — ADDENDUM NOTE
Addendum  created 04/08/24 1146 by Feliciano Yepez MD    Child order released for a procedure order, Clinical Note Signed, Intraprocedure Blocks edited, Intraprocedure Meds edited, SmartForm saved

## 2024-04-08 NOTE — ANESTHESIA PROCEDURE NOTES
Airway  Date/Time: 4/8/2024 7:36 AM  Urgency: elective    Airway not difficult    Staffing  Performed: SIDNEY   Authorized by: Feliciano Yepez MD    Performed by: SIDNEY Wahl  Patient location during procedure: OR    Indications and Patient Condition  Indications for airway management: anesthesia  Spontaneous Ventilation: absent  Sedation level: deep  Preoxygenated: yes  Patient position: sniffing  Mask difficulty assessment: 1 - vent by mask  Planned trial extubation    Final Airway Details  Final airway type: supraglottic airway      Successful airway: classic  Size 4     Number of attempts at approach: 1

## 2024-04-08 NOTE — NURSING NOTE
0849: Patient to bay with anesthesia and surgical team present. Handoff report and plan of care reviewed, all questions answered. VSS.    0902: Family updated via text message    0905: Patient removed from supplemental oxygen at this time    0908: Patient tolerating sips of ginger ale and pretzels at this time.    0939: Patient meets phase 1 discharge criteria at this time.    0940: Patient to phase 2    0943: Family at bedside    0945: Discharge instructions reviewed with patient and family, no further questions at this time.     0953: Dr Diggs at bedside    0955: Patient dressed with family assistance.     1003: Peripheral IV removed with no complications.     1017: Patient to main lobby via transport with all belongings in stable condition. Phase 2 complete.

## 2024-04-11 LAB
LABORATORY COMMENT REPORT: NORMAL
PATH REPORT.FINAL DX SPEC: NORMAL
PATH REPORT.GROSS SPEC: NORMAL
PATH REPORT.RELEVANT HX SPEC: NORMAL
PATH REPORT.TOTAL CANCER: NORMAL

## 2024-04-24 ENCOUNTER — OFFICE VISIT (OUTPATIENT)
Dept: ORTHOPEDIC SURGERY | Facility: CLINIC | Age: 34
End: 2024-04-24
Payer: COMMERCIAL

## 2024-04-24 DIAGNOSIS — R22.41 MASS OF RIGHT FOOT: Primary | ICD-10-CM

## 2024-04-24 PROCEDURE — 1036F TOBACCO NON-USER: CPT | Performed by: STUDENT IN AN ORGANIZED HEALTH CARE EDUCATION/TRAINING PROGRAM

## 2024-04-24 PROCEDURE — 99024 POSTOP FOLLOW-UP VISIT: CPT | Performed by: STUDENT IN AN ORGANIZED HEALTH CARE EDUCATION/TRAINING PROGRAM

## 2024-04-24 ASSESSMENT — PAIN DESCRIPTION - DESCRIPTORS: DESCRIPTORS: ACHING;NUMBNESS

## 2024-04-24 ASSESSMENT — PAIN - FUNCTIONAL ASSESSMENT: PAIN_FUNCTIONAL_ASSESSMENT: 0-10

## 2024-04-24 ASSESSMENT — PAIN SCALES - GENERAL: PAINLEVEL_OUTOF10: 1

## 2024-04-24 NOTE — PROGRESS NOTES
ORTHOPAEDIC SURGERY PROGRESS NOTE    Chief Complaint:  Right ankle mass    History Of Present Illness  Cristina Bowery-Lombardo is a 33 y.o. female who presents for evaluation of the right foot mass, self-referred.  Patient reports noticing a mass on the outside of her ankle for approximately 1 month.  There is no associated pain with the mass.  She has not had any irritation with shoe wear.  She reports no trauma or associated numbness, tingling or weakness.  She has not noted any change in the size of the mass, neither increasing or decreasing.  She has not had any other associated symptoms.    Patient reports a remote history, circa 2011 of a significant foot injury while in college.  She brought with her a CT report that describes a intra-articular cuboid fracture.  She has not had significant symptoms from this injury.    Patient is otherwise in her usual state of health.  She works in a sales capacity.    12/05/2023: Patient returns for follow-up of her right foot mass.  Patient reports no significant change in the size or symptoms related to her foot mass.  She has noted increased pressure and swelling on certain days which results in discomfort but has not limited any of her activities.  She denies any associated numbness, tingling or weakness.    01/16/2024: Patient returns for follow-up of her right foot mass.  She did undergo USG aspiration from 12/07/2023.  She did initially notice a decrease in the size of the mass and symptoms but unfortunately had recurrence approximately 10 days later.  She has had some increased irritation with shoewear overlying the mass but overall reports 2 out of 10 pain.  She denies any new trauma.  She has no associated numbness, tingling or weakness.    03/05/2024: Patient returns for follow-up of her right foot mass.  She is present with her  today.  She would like to have the mass removed as it has been been persistently bothersome to her, particularly with shoewear.   She has been covering the site to avoid irritation.  There has been significant improvement.  She reports no interval trauma or associated numbness, tingling weakness.  She denies pain over uneven surfaces.    04/24/2024: Patient returns for follow-up s/p right foot mass excision from 04/08/2024.  She is reporting minimal pain.  Her final pathology came back consistent with ganglion cyst.  She has been compliant with weightbearing restrictions utilizing a walking boot and has maintained her surgical dressing.  She has been without fevers, chills or night sweats.  She is reporting some resolving numbness and a DPN distribution.     Past Medical History  Past Medical History:   Diagnosis Date    Mass of right foot        Surgical History  Recent Surgeries in Orthopaedic Surgery            No cases to display             Social History  Social History     Socioeconomic History    Marital status:      Spouse name: Not on file    Number of children: Not on file    Years of education: Not on file    Highest education level: Not on file   Occupational History    Not on file   Tobacco Use    Smoking status: Never    Smokeless tobacco: Never   Vaping Use    Vaping status: Never Used   Substance and Sexual Activity    Alcohol use: Not Currently    Drug use: Never    Sexual activity: Defer   Other Topics Concern    Not on file   Social History Narrative    Not on file     Social Determinants of Health     Financial Resource Strain: Low Risk  (9/21/2020)    Received from [facility]    Overall Financial Resource Strain (CARDIA)     Difficulty of Paying Living Expenses: Not hard at all   Food Insecurity: No Food Insecurity (9/21/2020)    Received from [facility]    Hunger Vital Sign     Worried About Running Out of Food in the Last Year: Never true     Ran Out of Food in the Last Year: Never true   Transportation Needs: No Transportation Needs (9/21/2020)    Received from [facility]    PRAPARE -  Transportation     Lack of Transportation (Medical): No     Lack of Transportation (Non-Medical): No   Physical Activity: Sufficiently Active (9/21/2020)    Received from [facility]    Exercise Vital Sign     Days of Exercise per Week: 5 days     Minutes of Exercise per Session: 60 min   Stress: No Stress Concern Present (9/21/2020)    Received from [facility]    [employer] - Occupational Stress Questionnaire     Feeling of Stress : Not at all   Social Connections: Not on file   Intimate Partner Violence: Not on file   Housing Stability: Not on file       Family History  Family History   Problem Relation Name Age of Onset    Osteopenia Mother      Hypertension Father      Stomach cancer Maternal Grandmother      Brain cancer Mother's Sister          Allergies  Allergies   Allergen Reactions    Penicillins Hives and Itching       Review of Systems  REVIEW OF SYSTEMS  Constitutional: no unplanned weight loss  Psychiatric: no suicidal ideation  ENT: no vision changes, no sinus problems  Pulmonary: no shortness of breath during office visit today  Lymphatic: no enlarged lymph nodes  Cardiovascular: no chest pain or shortness of breath during office visit today  Gastrointestinal: no stomach problems  Genitourinary: no dysuria   Skin: no rashes  Endocrine: no thyroid problems  Neurological: no headache, no numbness  Hematological: no easy bruising  Musculoskeletal: Right foot mass    Physical Exam  PHYSICAL EXAMINATION  Constitutional Exam: well developed and well nourished  Psychiatric Exam: alert and oriented, appropriate mood and behavior  Eye Exam: EOMI  Pulmonary Exam: breathing non-labored, no apparent distress  Lymphatic exam: no appreciable lymphadenopathy in the lower extremities  Cardiovascular exam: RRR to peripheral palpation, DP pulses 2+, PT 2+, toes are pink with good capillary refill, no pitting edema  Skin exam: no open lesions, rashes, abrasions or  ulcerations  Neurological exam: sensation to light touch intact in both lower extremities in peripheral and dermatomal distributions (except for any abnormalities noted in musculoskeletal exam)    Musculoskeletal exam: Right lower extremity examination.  Patient with suture line and skin edges well-approximated about sinus Tarsi incision.  There is no angelo-incisional erythema, induration, fluctuance or drainage.  There is no obvious recurrence of the mass.  Patient has minimal pain or incisional tenderness to palpation.  She has a supple and pain-free ankle and midtarsal joint range of motion, subtalar joint range of motion deferred.  Patient has sensation intact light touch grossly in the saphenous, sural, superficial peroneal, tibial nerve distribution with reported numbness in a DPN distribution.  She has 5 out of 5 strength in PF/DF/EHL with 2+ DP/PT pulses palpated.    Last Recorded Vitals  Last menstrual period 03/12/2024, not currently breastfeeding.    Laboratory Results    No results found for this or any previous visit (from the past 24 hour(s)).    Radiology Results   No new imaging at this visit.    Assessment/Plan:  33-year-old female who presents s/p right ankle ganglion cyst excision from 04/08/2024 with final pathology confirmatory for ganglion cyst.  I we will recommend the patient continue weightbearing as tolerated in her right lower extremity, she may steadily transition out of the walking boot and into a regular shoe.  I will remove her sutures today and have asked the patient to keep her wound covered while ambulating in regular shoes.  She will continue on aspirin for DVT prophylaxis and I will plan to see the patient back in approximately 2 weeks for repeat clinical evaluation.  Upon return, patient would not require further imaging.    Hitesh Diggs MD, BRENNA  Department of Orthopaedic Surgery  [facility]    The diagnosis and treatment plan were  reviewed with the patient. All questions were answered. The patient verbalized understanding of the treatment plan. There were no barriers to understanding identified.    Note dictated with [employer] software.  Completed without full type editing and sent to avoid delay.

## 2024-04-30 ENCOUNTER — APPOINTMENT (OUTPATIENT)
Dept: ORTHOPEDIC SURGERY | Facility: HOSPITAL | Age: 34
End: 2024-04-30
Payer: COMMERCIAL

## 2024-05-07 ENCOUNTER — APPOINTMENT (OUTPATIENT)
Dept: ORTHOPEDIC SURGERY | Facility: HOSPITAL | Age: 34
End: 2024-05-07
Payer: COMMERCIAL

## 2024-05-07 ENCOUNTER — OFFICE VISIT (OUTPATIENT)
Dept: ORTHOPEDIC SURGERY | Facility: HOSPITAL | Age: 34
End: 2024-05-07
Payer: COMMERCIAL

## 2024-05-07 DIAGNOSIS — R22.41 MASS OF RIGHT FOOT: Primary | ICD-10-CM

## 2024-05-07 PROCEDURE — 99024 POSTOP FOLLOW-UP VISIT: CPT | Performed by: STUDENT IN AN ORGANIZED HEALTH CARE EDUCATION/TRAINING PROGRAM

## 2024-05-07 PROCEDURE — 1036F TOBACCO NON-USER: CPT | Performed by: STUDENT IN AN ORGANIZED HEALTH CARE EDUCATION/TRAINING PROGRAM

## 2024-05-07 ASSESSMENT — PAIN SCALES - GENERAL: PAINLEVEL_OUTOF10: 1

## 2024-05-07 ASSESSMENT — PAIN DESCRIPTION - DESCRIPTORS: DESCRIPTORS: ACHING;SORE;DULL

## 2024-05-07 ASSESSMENT — PAIN - FUNCTIONAL ASSESSMENT: PAIN_FUNCTIONAL_ASSESSMENT: 0-10

## 2024-05-07 NOTE — PROGRESS NOTES
ORTHOPAEDIC SURGERY PROGRESS NOTE    Chief Complaint:  Right ankle mass    History Of Present Illness  Cristina Bowery-Lombardo is a 33 y.o. female who presents for evaluation of the right foot mass, self-referred.  Patient reports noticing a mass on the outside of her ankle for approximately 1 month.  There is no associated pain with the mass.  She has not had any irritation with shoe wear.  She reports no trauma or associated numbness, tingling or weakness.  She has not noted any change in the size of the mass, neither increasing or decreasing.  She has not had any other associated symptoms.    Patient reports a remote history, circa 2011 of a significant foot injury while in college.  She brought with her a CT report that describes a intra-articular cuboid fracture.  She has not had significant symptoms from this injury.    Patient is otherwise in her usual state of health.  She works in a sales capacity.    12/05/2023: Patient returns for follow-up of her right foot mass.  Patient reports no significant change in the size or symptoms related to her foot mass.  She has noted increased pressure and swelling on certain days which results in discomfort but has not limited any of her activities.  She denies any associated numbness, tingling or weakness.    01/16/2024: Patient returns for follow-up of her right foot mass.  She did undergo USG aspiration from 12/07/2023.  She did initially notice a decrease in the size of the mass and symptoms but unfortunately had recurrence approximately 10 days later.  She has had some increased irritation with shoewear overlying the mass but overall reports 2 out of 10 pain.  She denies any new trauma.  She has no associated numbness, tingling or weakness.    03/05/2024: Patient returns for follow-up of her right foot mass.  She is present with her  today.  She would like to have the mass removed as it has been been persistently bothersome to her, particularly with shoewear.   She has been covering the site to avoid irritation.  There has been significant improvement.  She reports no interval trauma or associated numbness, tingling weakness.  She denies pain over uneven surfaces.    04/24/2024: Patient returns for follow-up s/p right foot mass excision from 04/08/2024.  She is reporting minimal pain.  Her final pathology came back consistent with ganglion cyst.  She has been compliant with weightbearing restrictions utilizing a walking boot and has maintained her surgical dressing.  She has been without fevers, chills or night sweats.  She is reporting some resolving numbness and a DPN distribution.    05/07/2024: Patient returns for follow-up s/p right foot mass excision from 04/08/2024.  She currently reports minimal pain and has been progressing with respect to her activities.  She has not had any fevers, chills, night sweats or drainage from her wound.  She is reporting continued improvement in foot numbness.  She is pleased with her results so far.     Past Medical History  Past Medical History:   Diagnosis Date    Mass of right foot        Surgical History  Recent Surgeries in Orthopaedic Surgery            No cases to display             Social History  Social History     Socioeconomic History    Marital status:      Spouse name: Not on file    Number of children: Not on file    Years of education: Not on file    Highest education level: Not on file   Occupational History    Not on file   Tobacco Use    Smoking status: Never    Smokeless tobacco: Never   Vaping Use    Vaping status: Never Used   Substance and Sexual Activity    Alcohol use: Not Currently    Drug use: Never    Sexual activity: Defer   Other Topics Concern    Not on file   Social History Narrative    Not on file     Social Determinants of Health     Financial Resource Strain: Low Risk  (9/21/2020)    Received from [facility]    Overall Financial Resource Strain (CARDI)     Difficulty of Paying Living  Expenses: Not hard at all   Food Insecurity: No Food Insecurity (9/21/2020)    Received from [facility]    Hunger Vital Sign     Worried About Running Out of Food in the Last Year: Never true     Ran Out of Food in the Last Year: Never true   Transportation Needs: No Transportation Needs (9/21/2020)    Received from [facility]    PRAPARE - Transportation     Lack of Transportation (Medical): No     Lack of Transportation (Non-Medical): No   Physical Activity: Sufficiently Active (9/21/2020)    Received from [facility]    Exercise Vital Sign     Days of Exercise per Week: 5 days     Minutes of Exercise per Session: 60 min   Stress: No Stress Concern Present (9/21/2020)    Received from [facility]    [place] [employer] - Occupational Stress Questionnaire     Feeling of Stress : Not at all   Social Connections: Not on file   Intimate Partner Violence: Not on file   Housing Stability: Not on file       Family History  Family History   Problem Relation Name Age of Onset    Osteopenia Mother      Hypertension Father      Stomach cancer Maternal Grandmother      Brain cancer Mother's Sister          Allergies  Allergies   Allergen Reactions    Penicillins Hives and Itching       Review of Systems  REVIEW OF SYSTEMS  Constitutional: no unplanned weight loss  Psychiatric: no suicidal ideation  ENT: no vision changes, no sinus problems  Pulmonary: no shortness of breath during office visit today  Lymphatic: no enlarged lymph nodes  Cardiovascular: no chest pain or shortness of breath during office visit today  Gastrointestinal: no stomach problems  Genitourinary: no dysuria   Skin: no rashes  Endocrine: no thyroid problems  Neurological: no headache, no numbness  Hematological: no easy bruising  Musculoskeletal: Right foot mass    Physical Exam  PHYSICAL EXAMINATION  Constitutional Exam: well developed and well nourished  Psychiatric Exam: alert and oriented, appropriate mood and  behavior  Eye Exam: EOMI  Pulmonary Exam: breathing non-labored, no apparent distress  Lymphatic exam: no appreciable lymphadenopathy in the lower extremities  Cardiovascular exam: RRR to peripheral palpation, DP pulses 2+, PT 2+, toes are pink with good capillary refill, no pitting edema  Skin exam: no open lesions, rashes, abrasions or ulcerations  Neurological exam: sensation to light touch intact in both lower extremities in peripheral and dermatomal distributions (except for any abnormalities noted in musculoskeletal exam)    Musculoskeletal exam: Right lower extremity examination.  Patient with well-healed sinus Tarsi incision.  There is no angelo-incisional erythema, induration, fluctuance or drainage.  There is no obvious recurrence of the mass.  Patient is nontender to palpation about the sinus Tarsi and lateral ankle gutter.  Patient has supple and pain-free ankle, subtalar and midtarsal joint range of motion. Patient has sensation intact light touch grossly in the saphenous, sural, superficial peroneal, tibial nerve distribution with reported numbness in a DPN distribution, improving.  She has 5 out of 5 strength in PF/DF/EHL with 2+ DP/PT pulses palpated.    Last Recorded Vitals  Last menstrual period 03/12/2024, not currently breastfeeding.    Laboratory Results    No results found for this or any previous visit (from the past 24 hour(s)).    Radiology Results   No new imaging at this visit.    Assessment/Plan:  33-year-old female who presents s/p right ankle ganglion cyst excision from 04/08/2024 with final pathology confirmatory for ganglion cyst.  I will recommend the patient continue weightbearing as tolerated in her right lower extremity, I have no formal restrictions for her at this time point.  I have encouraged the patient to steadily return to her exercise activities so as to avoid other injury.  She may discontinue aspirin for DVT prophylaxis and I will plan to see the patient back in  approximately 2 months for repeat clinical evaluation.  Upon return, patient would not require further imaging.    Hitesh Diggs MD, BRENNA  Department of Orthopaedic Surgery  [facility]    The diagnosis and treatment plan were reviewed with the patient. All questions were answered. The patient verbalized understanding of the treatment plan. There were no barriers to understanding identified.    Note dictated with [employer] software.  Completed without full type editing and sent to avoid delay.

## 2024-05-08 ENCOUNTER — APPOINTMENT (OUTPATIENT)
Dept: ORTHOPEDIC SURGERY | Facility: CLINIC | Age: 34
End: 2024-05-08
Payer: COMMERCIAL

## 2024-05-08 ENCOUNTER — OFFICE VISIT (OUTPATIENT)
Dept: OBSTETRICS AND GYNECOLOGY | Facility: CLINIC | Age: 34
End: 2024-05-08
Payer: COMMERCIAL

## 2024-05-08 VITALS
DIASTOLIC BLOOD PRESSURE: 64 MMHG | HEIGHT: 70 IN | BODY MASS INDEX: 24.48 KG/M2 | SYSTOLIC BLOOD PRESSURE: 108 MMHG | WEIGHT: 171 LBS

## 2024-05-08 DIAGNOSIS — Z00.00 ENCOUNTER FOR MEDICAL EXAMINATION TO ESTABLISH CARE: Primary | ICD-10-CM

## 2024-05-08 DIAGNOSIS — N63.20 MASS OF LEFT BREAST, UNSPECIFIED QUADRANT: ICD-10-CM

## 2024-05-08 DIAGNOSIS — Z01.419 WELL WOMAN EXAM: ICD-10-CM

## 2024-05-08 PROCEDURE — 1036F TOBACCO NON-USER: CPT | Performed by: ADVANCED PRACTICE MIDWIFE

## 2024-05-08 PROCEDURE — 99395 PREV VISIT EST AGE 18-39: CPT | Performed by: ADVANCED PRACTICE MIDWIFE

## 2024-05-08 ASSESSMENT — PAIN SCALES - GENERAL: PAINLEVEL: 0-NO PAIN

## 2024-05-08 NOTE — PROGRESS NOTES
"Assessment/Plan   Problem List Items Addressed This Visit             ICD-10-CM       Medium    Encounter for medical examination to establish care - Primary Z00.00    Relevant Orders    Referral to Primary Care    Mass of left breast N63.20    Relevant Orders    BI US breast limited left    Well woman exam Z01.419     Mirena good until 2026  Pap due 2 years  Declines STI screen  RTO 1 year or prn             Ermelinda Umana, APRN-CNM     Subjective   Cristina Bowery-Lombardo is a 33 y.o. female who is here for a routine exam.     Lives with: spouse,    Current employment: [employer] in [employer]   T/E/D: never/rare/never   Up to date vision/dental:  PCP: needs to establish   Menstrual history: amenorrhea with IUD   Sexual history: Monogamous male partner   Pregnancy history:  G/P 0  T: P:  EAB:   Ectopic:   SAB:   L:      Diet: balanced  Exercise: stretching, yoga, pilates     PMH, PSH, and Family hx reviewed and updated    Current contraception: IUD-Mirena inserted 2018  Refill Y/N:    Last pap: 2023  History of abnormal Pap smear: no  Family history of breast, uterine,  ovarian cancer: no  Regular self breast exam: yes    Review of Systems    Objective   /64   Ht 1.778 m (5' 10\")   Wt 77.6 kg (171 lb)   LMP 03/12/2024 (Approximate) Comment: has Mirena, and irregular periods  BMI 24.54 kg/m²     Physical Exam  Constitutional:       Appearance: Normal appearance.   HENT:      Head: Normocephalic.   Neck:      Thyroid: No thyroid mass, thyromegaly or thyroid tenderness.   Cardiovascular:      Rate and Rhythm: Normal rate and regular rhythm.   Pulmonary:      Effort: Pulmonary effort is normal.      Breath sounds: Normal breath sounds.   Chest:   Breasts:     Right: Normal. No mass, nipple discharge or tenderness.      Left: Normal. No mass, nipple discharge or tenderness.          Comments: 1cm, mobile, nontender mass @ 12 oclock on left breast   Abdominal:      Palpations: Abdomen is soft. "   Genitourinary:     Vagina: Normal.      Cervix: Normal.   Musculoskeletal:         General: Normal range of motion.   Lymphadenopathy:      Upper Body:      Right upper body: No axillary adenopathy.      Left upper body: No axillary adenopathy.   Skin:     General: Skin is warm and dry.   Neurological:      Mental Status: She is alert and oriented to person, place, and time.   Psychiatric:         Mood and Affect: Mood normal.

## 2024-05-13 ENCOUNTER — APPOINTMENT (OUTPATIENT)
Dept: RADIOLOGY | Facility: HOSPITAL | Age: 34
End: 2024-05-13
Payer: COMMERCIAL

## 2024-05-22 ENCOUNTER — HOSPITAL ENCOUNTER (OUTPATIENT)
Dept: RADIOLOGY | Facility: HOSPITAL | Age: 34
Discharge: HOME | End: 2024-05-22
Payer: COMMERCIAL

## 2024-05-22 VITALS — WEIGHT: 167 LBS | BODY MASS INDEX: 23.91 KG/M2 | HEIGHT: 70 IN

## 2024-05-22 DIAGNOSIS — N63.0 PALPABLE MASS OF BREAST: ICD-10-CM

## 2024-05-22 DIAGNOSIS — N63.20 MASS OF LEFT BREAST, UNSPECIFIED QUADRANT: ICD-10-CM

## 2024-05-22 PROCEDURE — 77062 BREAST TOMOSYNTHESIS BI: CPT

## 2024-05-22 PROCEDURE — 76642 ULTRASOUND BREAST LIMITED: CPT | Mod: LT

## 2024-05-23 ENCOUNTER — TELEPHONE (OUTPATIENT)
Dept: OBSTETRICS AND GYNECOLOGY | Facility: CLINIC | Age: 34
End: 2024-05-23
Payer: COMMERCIAL

## 2024-05-23 DIAGNOSIS — N63.20 MASS OF LEFT BREAST, UNSPECIFIED QUADRANT: ICD-10-CM

## 2024-05-23 NOTE — TELEPHONE ENCOUNTER
"Pt contacted.    Discussed recent breast imaging results.  Discussed likely typo noticed in last line of the ultrasound paragraph.   Office has reached to radiologist to confim/deny \"suspicious findings identified\"  Understanding voiced that plan of care will likely be US left breast in 6 mos for stability on fibroadenoma  "

## 2024-06-04 ENCOUNTER — APPOINTMENT (OUTPATIENT)
Dept: PRIMARY CARE | Facility: CLINIC | Age: 34
End: 2024-06-04
Payer: COMMERCIAL

## 2024-07-16 ENCOUNTER — APPOINTMENT (OUTPATIENT)
Dept: ORTHOPEDIC SURGERY | Facility: HOSPITAL | Age: 34
End: 2024-07-16
Payer: COMMERCIAL

## 2024-11-26 ENCOUNTER — HOSPITAL ENCOUNTER (OUTPATIENT)
Dept: RADIOLOGY | Facility: CLINIC | Age: 34
Discharge: HOME | End: 2024-11-26
Payer: COMMERCIAL

## 2024-11-26 DIAGNOSIS — N63.20 MASS OF LEFT BREAST, UNSPECIFIED QUADRANT: ICD-10-CM

## 2024-11-26 PROCEDURE — 76982 USE 1ST TARGET LESION: CPT | Mod: LT

## 2024-11-26 PROCEDURE — 76642 ULTRASOUND BREAST LIMITED: CPT | Mod: LEFT SIDE | Performed by: STUDENT IN AN ORGANIZED HEALTH CARE EDUCATION/TRAINING PROGRAM

## 2024-11-26 PROCEDURE — 76642 ULTRASOUND BREAST LIMITED: CPT | Mod: LT

## 2024-11-29 ENCOUNTER — TELEPHONE (OUTPATIENT)
Dept: OBSTETRICS AND GYNECOLOGY | Facility: CLINIC | Age: 34
End: 2024-11-29
Payer: COMMERCIAL

## 2024-11-29 DIAGNOSIS — N63.20 MASS OF LEFT BREAST, UNSPECIFIED QUADRANT: ICD-10-CM

## 2024-11-29 NOTE — TELEPHONE ENCOUNTER
Pt contacted.   Discussed stable findings of left breast cyst.   Recommendation is to schedule another left breast US in 5/2025 per radiologist.   Understanding voiced.  Order placed in system

## 2025-02-20 LAB
CHOLESTEROL (MG/DL) IN SER/PLAS EXTERNAL: 177 MG/DL
CHOLESTEROL IN HDL (MG/DL) IN SER/PLAS EXTERNAL: 80 MG/DL
CHOLESTEROL IN LDL (MG/DL) IN SERUM OR PLASMA BY CALCULATION EXTERNAL: 85 MG/DL
CHOLESTEROL/HDL RATIO EXTERNAL: 2.2
GLUCOSE, FASTING EXTERNAL: 100
NON HDL CHOLESTEROL EXTERNAL: 97 MG/DL
TRIGLYCERIDE (MG/DL) IN SER/PLAS EXTERNAL: 50 MG/DL

## 2025-03-14 ENCOUNTER — APPOINTMENT (OUTPATIENT)
Dept: PRIMARY CARE | Facility: CLINIC | Age: 35
End: 2025-03-14
Payer: COMMERCIAL

## 2025-03-14 VITALS
SYSTOLIC BLOOD PRESSURE: 105 MMHG | OXYGEN SATURATION: 94 % | HEIGHT: 70 IN | DIASTOLIC BLOOD PRESSURE: 69 MMHG | BODY MASS INDEX: 25.2 KG/M2 | WEIGHT: 176 LBS | HEART RATE: 77 BPM

## 2025-03-14 DIAGNOSIS — Z00.00 WELLNESS EXAMINATION: Primary | ICD-10-CM

## 2025-03-14 DIAGNOSIS — N63.20 MASS OF LEFT BREAST, UNSPECIFIED QUADRANT: ICD-10-CM

## 2025-03-14 PROCEDURE — 3008F BODY MASS INDEX DOCD: CPT

## 2025-03-14 PROCEDURE — 1036F TOBACCO NON-USER: CPT

## 2025-03-14 PROCEDURE — 99385 PREV VISIT NEW AGE 18-39: CPT

## 2025-03-14 ASSESSMENT — PATIENT HEALTH QUESTIONNAIRE - PHQ9
2. FEELING DOWN, DEPRESSED OR HOPELESS: NOT AT ALL
1. LITTLE INTEREST OR PLEASURE IN DOING THINGS: NOT AT ALL
SUM OF ALL RESPONSES TO PHQ9 QUESTIONS 1 AND 2: 0

## 2025-03-14 NOTE — PROGRESS NOTES
"  Subjective   Patient ID: Cristina Bowery-Lombardo is a 34 y.o. female who presents for Annual Exam (Pt states she is here to establish care and for annual exam. No other concerns at the moment. Pt states she sees an OBGYN. Last pap 2023).    Patient presents to establish care and have yearly physical.  Patient states that she has no concerns at this time.  She does endorse having a mammogram and needed to have a follow-up ultrasound due to her dense breast and was found to have a fibroadenoma however will need a another ultrasound in May.  Otherwise patient is generally healthy and very active.    Past Medical, Surgical, Social and Family Hx reviewed-Yes        Last pap: 2023  Last Mammogram:   Colon CA screening Hx: N/A  Labs: Recently done through [facility]  Up to date on immunizations: Up to date  Dentist in the past year: Yes    Menstruation: No period with IUD   Sexual Activity: No concerns         PE:  /69   Pulse 77   Ht 1.778 m (5' 10\")   Wt 79.8 kg (176 lb)   SpO2 94%   BMI 25.25 kg/m²   Alert adult woman, NAD  HEENT clear  Neck supple, No LAD  Heart RRR no murmur  Lungs CTA bilat  Abdomen benign, normal BS, soft NT/ND  Skin warm, dry, clear  Neuro grossly normal, gait WNL  Affect pleasant and appropriate, memory and judgement WNL    Discussed with patient that I recommend that she bring down her [facility] lab work to review the labs that were done by her biometric screening for work.  If additional lab work is needed I will order for her to get completed.  Up-to-date on all immunizations.  Follow-up ultrasound of left breast ordered.  Recommending following up in 1 year for physical.    Assessment/Plan   Problem List Items Addressed This Visit             ICD-10-CM    Mass of left breast N63.20    Relevant Orders    BI US breast limited left    Wellness examination - Primary Z00.00          "

## 2025-05-13 ENCOUNTER — HOSPITAL ENCOUNTER (OUTPATIENT)
Dept: RADIOLOGY | Facility: CLINIC | Age: 35
Discharge: HOME | End: 2025-05-13
Payer: COMMERCIAL

## 2025-05-13 DIAGNOSIS — N63.20 MASS OF LEFT BREAST, UNSPECIFIED QUADRANT: ICD-10-CM

## 2025-05-13 PROCEDURE — 76642 ULTRASOUND BREAST LIMITED: CPT | Mod: LEFT SIDE | Performed by: STUDENT IN AN ORGANIZED HEALTH CARE EDUCATION/TRAINING PROGRAM

## 2025-05-13 PROCEDURE — 76982 USE 1ST TARGET LESION: CPT | Mod: LT

## 2025-05-13 PROCEDURE — 76642 ULTRASOUND BREAST LIMITED: CPT | Mod: LT

## 2025-09-04 ENCOUNTER — APPOINTMENT (OUTPATIENT)
Dept: OBSTETRICS AND GYNECOLOGY | Facility: CLINIC | Age: 35
End: 2025-09-04
Payer: COMMERCIAL

## 2026-09-10 ENCOUNTER — APPOINTMENT (OUTPATIENT)
Dept: OBSTETRICS AND GYNECOLOGY | Facility: CLINIC | Age: 36
End: 2026-09-10
Payer: COMMERCIAL

## (undated) DEVICE — COVER, MAYO STAND, W/PAD, 23 IN, DISPOSABLE, PLASTIC, LF, STERILE

## (undated) DEVICE — Device

## (undated) DEVICE — BLADE, OSCILLATING/SAGITTAL, 25MM X 9MM

## (undated) DEVICE — COVER, EQUIPMENT, C ARM, W/ STRAPS

## (undated) DEVICE — DRAPE, SHEET, U, W/ADHESIVE STRIP, IMPERVIOUS, 60 X 70 IN, DISPOSABLE, LF, STERILE

## (undated) DEVICE — GLOVE, SURGICAL, PROTEXIS PI MICRO, 7.5, PF, LF

## (undated) DEVICE — PAD, GROUNDING, ELECTROSURGICAL, W/9 FT CABLE, POLYHESIVE II, ADULT, LF

## (undated) DEVICE — DRAPE, SHEET, THREE QUARTER, FAN FOLD, 57 X 77 IN

## (undated) DEVICE — PADDING, WEBRIL, UNDERCAST, STERILE, 6 IN

## (undated) DEVICE — THE STERILE LIGHT HANDLE COVER IS USED WITH STERIS SURGICAL LIGHTING AND VISUALIZATION SYSTEMS.

## (undated) DEVICE — SOLUTION, IRRIGATION, SODIUM CHLORIDE 0.9%, 1000 ML, POUR BOTTLE

## (undated) DEVICE — SUTURE, VICRYL, 3-0, 27 IN, SH

## (undated) DEVICE — GLOVE, SURGICAL, PROTEXIS PI BLUE W/NEUTHERA, 7.5, PF, LF